# Patient Record
Sex: FEMALE | Race: WHITE | Employment: UNEMPLOYED | URBAN - METROPOLITAN AREA
[De-identification: names, ages, dates, MRNs, and addresses within clinical notes are randomized per-mention and may not be internally consistent; named-entity substitution may affect disease eponyms.]

---

## 2020-06-04 ENCOUNTER — APPOINTMENT (OUTPATIENT)
Dept: GENERAL RADIOLOGY | Facility: CLINIC | Age: 30
End: 2020-06-04
Payer: MEDICAID

## 2020-06-04 ENCOUNTER — HOSPITAL ENCOUNTER (EMERGENCY)
Facility: CLINIC | Age: 30
Discharge: HOME | End: 2020-06-04
Attending: EMERGENCY MEDICINE
Payer: MEDICAID

## 2020-06-04 VITALS
WEIGHT: 135 LBS | DIASTOLIC BLOOD PRESSURE: 87 MMHG | TEMPERATURE: 98.6 F | HEIGHT: 66 IN | RESPIRATION RATE: 20 BRPM | OXYGEN SATURATION: 100 % | BODY MASS INDEX: 21.69 KG/M2 | HEART RATE: 59 BPM | SYSTOLIC BLOOD PRESSURE: 137 MMHG

## 2020-06-04 PROCEDURE — 99283 EMERGENCY DEPT VISIT LOW MDM: CPT

## 2020-06-04 PROCEDURE — 73610 X-RAY EXAM OF ANKLE: CPT

## 2020-06-04 ASSESSMENT — PAIN DESCRIPTION - LOCATION: LOCATION: ANKLE

## 2020-06-04 ASSESSMENT — PAIN DESCRIPTION - ORIENTATION: ORIENTATION: RIGHT

## 2020-06-04 ASSESSMENT — PAIN SCALES - GENERAL: PAINLEVEL_OUTOF10: 7

## 2020-06-04 ASSESSMENT — PAIN DESCRIPTION - PAIN TYPE: TYPE: ACUTE PAIN

## 2020-09-13 ENCOUNTER — HOSPITAL ENCOUNTER (EMERGENCY)
Facility: CLINIC | Age: 30
Discharge: HOME | End: 2020-09-13
Attending: EMERGENCY MEDICINE
Payer: MEDICAID

## 2020-09-13 VITALS
OXYGEN SATURATION: 100 % | DIASTOLIC BLOOD PRESSURE: 90 MMHG | HEIGHT: 66 IN | HEART RATE: 62 BPM | TEMPERATURE: 98.6 F | WEIGHT: 132 LBS | RESPIRATION RATE: 16 BRPM | SYSTOLIC BLOOD PRESSURE: 123 MMHG | BODY MASS INDEX: 21.21 KG/M2

## 2020-09-13 LAB
ABSOLUTE EOS #: 0.1 K/UL (ref 0–0.4)
ABSOLUTE IMMATURE GRANULOCYTE: ABNORMAL K/UL (ref 0–0.3)
ABSOLUTE LYMPH #: 1.3 K/UL (ref 1–4.8)
ABSOLUTE MONO #: 0.8 K/UL (ref 0.1–1.2)
ANION GAP SERPL CALCULATED.3IONS-SCNC: 11 MMOL/L (ref 9–17)
BASOPHILS # BLD: 0 % (ref 0–2)
BASOPHILS ABSOLUTE: 0 K/UL (ref 0–0.2)
BILIRUBIN URINE: NEGATIVE
BUN BLDV-MCNC: 14 MG/DL (ref 6–20)
BUN/CREAT BLD: NORMAL (ref 9–20)
CALCIUM SERPL-MCNC: 9 MG/DL (ref 8.6–10.4)
CHLORIDE BLD-SCNC: 101 MMOL/L (ref 98–107)
CO2: 26 MMOL/L (ref 20–31)
COLOR: YELLOW
COMMENT UA: ABNORMAL
CREAT SERPL-MCNC: 0.7 MG/DL (ref 0.5–0.9)
DIFFERENTIAL TYPE: ABNORMAL
DIRECT EXAM: ABNORMAL
EOSINOPHILS RELATIVE PERCENT: 1 % (ref 1–4)
GFR AFRICAN AMERICAN: >60 ML/MIN
GFR NON-AFRICAN AMERICAN: >60 ML/MIN
GFR SERPL CREATININE-BSD FRML MDRD: NORMAL ML/MIN/{1.73_M2}
GFR SERPL CREATININE-BSD FRML MDRD: NORMAL ML/MIN/{1.73_M2}
GLUCOSE BLD-MCNC: 90 MG/DL (ref 70–99)
GLUCOSE URINE: NEGATIVE
HCG(URINE) PREGNANCY TEST: NEGATIVE
HCT VFR BLD CALC: 40 % (ref 36–46)
HEMOGLOBIN: 13.4 G/DL (ref 12–16)
IMMATURE GRANULOCYTES: ABNORMAL %
KETONES, URINE: ABNORMAL
LEUKOCYTE ESTERASE, URINE: NEGATIVE
LYMPHOCYTES # BLD: 16 % (ref 24–44)
Lab: ABNORMAL
MCH RBC QN AUTO: 30.8 PG (ref 26–34)
MCHC RBC AUTO-ENTMCNC: 33.5 G/DL (ref 31–37)
MCV RBC AUTO: 92 FL (ref 80–100)
MONOCYTES # BLD: 10 % (ref 2–11)
NITRITE, URINE: NEGATIVE
NRBC AUTOMATED: ABNORMAL PER 100 WBC
PDW BLD-RTO: 12.7 % (ref 12.5–15.4)
PH UA: 5.5 (ref 5–8)
PLATELET # BLD: 246 K/UL (ref 140–450)
PLATELET ESTIMATE: ABNORMAL
PMV BLD AUTO: 8.2 FL (ref 6–12)
POTASSIUM SERPL-SCNC: 4 MMOL/L (ref 3.7–5.3)
PROTEIN UA: NEGATIVE
RBC # BLD: 4.35 M/UL (ref 4–5.2)
RBC # BLD: ABNORMAL 10*6/UL
SEG NEUTROPHILS: 73 % (ref 36–66)
SEGMENTED NEUTROPHILS ABSOLUTE COUNT: 5.7 K/UL (ref 1.8–7.7)
SODIUM BLD-SCNC: 138 MMOL/L (ref 135–144)
SPECIFIC GRAVITY UA: 1 (ref 1–1.03)
SPECIMEN DESCRIPTION: ABNORMAL
TURBIDITY: CLEAR
URINE HGB: NEGATIVE
UROBILINOGEN, URINE: NORMAL
WBC # BLD: 7.9 K/UL (ref 3.5–11)
WBC # BLD: ABNORMAL 10*3/UL

## 2020-09-13 PROCEDURE — 36415 COLL VENOUS BLD VENIPUNCTURE: CPT

## 2020-09-13 PROCEDURE — 6360000002 HC RX W HCPCS: Performed by: EMERGENCY MEDICINE

## 2020-09-13 PROCEDURE — 96374 THER/PROPH/DIAG INJ IV PUSH: CPT

## 2020-09-13 PROCEDURE — 80048 BASIC METABOLIC PNL TOTAL CA: CPT

## 2020-09-13 PROCEDURE — 87491 CHLMYD TRACH DNA AMP PROBE: CPT

## 2020-09-13 PROCEDURE — 81003 URINALYSIS AUTO W/O SCOPE: CPT

## 2020-09-13 PROCEDURE — 87510 GARDNER VAG DNA DIR PROBE: CPT

## 2020-09-13 PROCEDURE — 81025 URINE PREGNANCY TEST: CPT

## 2020-09-13 PROCEDURE — 99284 EMERGENCY DEPT VISIT MOD MDM: CPT

## 2020-09-13 PROCEDURE — 87480 CANDIDA DNA DIR PROBE: CPT

## 2020-09-13 PROCEDURE — 87660 TRICHOMONAS VAGIN DIR PROBE: CPT

## 2020-09-13 PROCEDURE — 85025 COMPLETE CBC W/AUTO DIFF WBC: CPT

## 2020-09-13 PROCEDURE — 87591 N.GONORRHOEAE DNA AMP PROB: CPT

## 2020-09-13 RX ORDER — KETOROLAC TROMETHAMINE 15 MG/ML
15 INJECTION, SOLUTION INTRAMUSCULAR; INTRAVENOUS ONCE
Status: COMPLETED | OUTPATIENT
Start: 2020-09-13 | End: 2020-09-13

## 2020-09-13 RX ORDER — METRONIDAZOLE 500 MG/1
500 TABLET ORAL 2 TIMES DAILY
Qty: 14 TABLET | Refills: 0 | Status: SHIPPED | OUTPATIENT
Start: 2020-09-13 | End: 2020-09-20

## 2020-09-13 RX ORDER — FLUCONAZOLE 150 MG/1
150 TABLET ORAL ONCE
Qty: 1 TABLET | Refills: 0 | Status: SHIPPED | OUTPATIENT
Start: 2020-09-13 | End: 2020-09-13

## 2020-09-13 RX ADMIN — KETOROLAC TROMETHAMINE 15 MG: 15 INJECTION, SOLUTION INTRAMUSCULAR; INTRAVENOUS at 13:55

## 2020-09-13 ASSESSMENT — PAIN SCALES - GENERAL
PAINLEVEL_OUTOF10: 8
PAINLEVEL_OUTOF10: 8

## 2020-09-13 ASSESSMENT — ENCOUNTER SYMPTOMS: ABDOMINAL PAIN: 1

## 2020-09-13 NOTE — ED PROVIDER NOTES
for Gardnerella vaginalis. (A)     Direct Exam NEGATIVE for Candida sp. Direct Exam NEGATIVE for Trichomonas vaginalis     Direct Exam       Method of testing is a DNA probe intended for detection and identification of Candida species, Gardnerella vaginalis, and Trichomonas vaginalis nucleic acid in vaginal fluid specimens from patients with symptoms of vaginitis/vaginosis.    CBC Auto Differential   Result Value Ref Range    WBC 7.9 3.5 - 11.0 k/uL    RBC 4.35 4.0 - 5.2 m/uL    Hemoglobin 13.4 12.0 - 16.0 g/dL    Hematocrit 40.0 36 - 46 %    MCV 92.0 80 - 100 fL    MCH 30.8 26 - 34 pg    MCHC 33.5 31 - 37 g/dL    RDW 12.7 12.5 - 15.4 %    Platelets 997 502 - 216 k/uL    MPV 8.2 6.0 - 12.0 fL    NRBC Automated NOT REPORTED per 100 WBC    Differential Type NOT REPORTED     Seg Neutrophils 73 (H) 36 - 66 %    Lymphocytes 16 (L) 24 - 44 %    Monocytes 10 2 - 11 %    Eosinophils % 1 1 - 4 %    Basophils 0 0 - 2 %    Immature Granulocytes NOT REPORTED 0 %    Segs Absolute 5.70 1.8 - 7.7 k/uL    Absolute Lymph # 1.30 1.0 - 4.8 k/uL    Absolute Mono # 0.80 0.1 - 1.2 k/uL    Absolute Eos # 0.10 0.0 - 0.4 k/uL    Basophils Absolute 0.00 0.0 - 0.2 k/uL    Absolute Immature Granulocyte NOT REPORTED 0.00 - 0.30 k/uL    WBC Morphology NOT REPORTED     RBC Morphology NOT REPORTED     Platelet Estimate NOT REPORTED    Basic Metabolic Panel   Result Value Ref Range    Glucose 90 70 - 99 mg/dL    BUN 14 6 - 20 mg/dL    CREATININE 0.70 0.50 - 0.90 mg/dL    Bun/Cre Ratio NOT REPORTED 9 - 20    Calcium 9.0 8.6 - 10.4 mg/dL    Sodium 138 135 - 144 mmol/L    Potassium 4.0 3.7 - 5.3 mmol/L    Chloride 101 98 - 107 mmol/L    CO2 26 20 - 31 mmol/L    Anion Gap 11 9 - 17 mmol/L    GFR Non-African American >60 >60 mL/min    GFR African American >60 >60 mL/min    GFR Comment          GFR Staging NOT REPORTED    Pregnancy, Urine   Result Value Ref Range    HCG(Urine) Pregnancy Test NEGATIVE NEGATIVE   Urinalysis Reflex to Culture   Result Value Ref Range    Color, UA YELLOW YELLOW    Turbidity UA CLEAR CLEAR    Glucose, Ur NEGATIVE NEGATIVE    Bilirubin Urine NEGATIVE NEGATIVE    Ketones, Urine SMALL (A) NEGATIVE    Specific Gravity, UA 1.004 (L) 1.005 - 1.030    Urine Hgb NEGATIVE NEGATIVE    pH, UA 5.5 5.0 - 8.0    Protein, UA NEGATIVE NEGATIVE    Urobilinogen, Urine Normal Normal    Nitrite, Urine NEGATIVE NEGATIVE    Leukocyte Esterase, Urine NEGATIVE NEGATIVE    Urinalysis Comments       Microscopic exam not performed based on chemical results unless requested in original order. Urinalysis Comments          Urinalysis Comments       Utilizing a urinalysis as the only screening method to exclude a potential uropathogen can be unreliable in many patient populations. Rapid screening tests are less sensitive than culture and if UTI is a clinical possibility, culture should be considered despite a negative urinalysis. EMERGENCY DEPARTMENT COURSE:   Vitals:    Vitals:    09/13/20 1328 09/13/20 1331   BP:  (!) 123/90   Pulse:  62   Resp:  16   Temp:  98.6 °F (37 °C)   TempSrc:  Oral   SpO2:  100%   Weight: 59.9 kg (132 lb)    Height: 5' 6\" (1.676 m)      -------------------------  BP: (!) 123/90, Temp: 98.6 °F (37 °C), Pulse: 62, Resp: 16      RE-EVALUATION:  Lab work and UA are unremarkable pelvic labs do reveal that the patient has a bacterial vaginosis she has no peritoneal findings given this I do feel able to be followed up as an outpatient I will write her a prescription for Flagyl recommending that she return to the ER for increased cramping any development of fever vomiting or other concerns otherwise follow-up with the family doctor of her choice calling [phone] for an appointment  At this time the patient is without objective evidence of an acute process requiring hospitalization or inpatient management.  They have remained hemodynamically stable throughout their entire ED visit and are stable for discharge with outpatient

## 2020-09-15 LAB
C TRACH DNA GENITAL QL NAA+PROBE: ABNORMAL
N. GONORRHOEAE DNA: NEGATIVE
SPECIMEN DESCRIPTION: ABNORMAL

## 2020-12-18 ENCOUNTER — HOSPITAL ENCOUNTER (OUTPATIENT)
Dept: LAB | Age: 30
Setting detail: SPECIMEN
Discharge: HOME | End: 2020-12-18
Payer: COMMERCIAL

## 2020-12-18 PROCEDURE — U0003 INFECTIOUS AGENT DETECTION BY NUCLEIC ACID (DNA OR RNA); SEVERE ACUTE RESPIRATORY SYNDROME CORONAVIRUS 2 (SARS-COV-2) (CORONAVIRUS DISEASE [COVID-19]), AMPLIFIED PROBE TECHNIQUE, MAKING USE OF HIGH THROUGHPUT TECHNOLOGIES AS DESCRIBED BY CMS-2020-01-R: HCPCS

## 2020-12-20 LAB
SARS-COV-2, RAPID: NORMAL
SARS-COV-2: NORMAL
SARS-COV-2: NOT DETECTED
SOURCE: NORMAL

## 2020-12-21 ENCOUNTER — ANESTHESIA EVENT (OUTPATIENT)
Dept: OPERATING ROOM | Age: 30
End: 2020-12-21
Payer: COMMERCIAL

## 2020-12-22 ENCOUNTER — HOSPITAL ENCOUNTER (OUTPATIENT)
Age: 30
Setting detail: OUTPATIENT SURGERY
Discharge: HOME | End: 2020-12-22
Attending: SURGERY | Admitting: SURGERY
Payer: COMMERCIAL

## 2020-12-22 ENCOUNTER — ANESTHESIA (OUTPATIENT)
Dept: OPERATING ROOM | Age: 30
End: 2020-12-22
Payer: COMMERCIAL

## 2020-12-22 VITALS — SYSTOLIC BLOOD PRESSURE: 107 MMHG | OXYGEN SATURATION: 100 % | DIASTOLIC BLOOD PRESSURE: 73 MMHG | TEMPERATURE: 97.5 F

## 2020-12-22 VITALS
RESPIRATION RATE: 16 BRPM | SYSTOLIC BLOOD PRESSURE: 109 MMHG | TEMPERATURE: 96.9 F | HEIGHT: 66 IN | OXYGEN SATURATION: 100 % | DIASTOLIC BLOOD PRESSURE: 62 MMHG | HEART RATE: 50 BPM | WEIGHT: 131 LBS | BODY MASS INDEX: 21.05 KG/M2

## 2020-12-22 LAB
-: NORMAL
HCG, PREGNANCY URINE (POC): NEGATIVE

## 2020-12-22 PROCEDURE — 2500000003 HC RX 250 WO HCPCS: Performed by: SURGERY

## 2020-12-22 PROCEDURE — 7100000010 HC PHASE II RECOVERY - FIRST 15 MIN: Performed by: SURGERY

## 2020-12-22 PROCEDURE — 7100000000 HC PACU RECOVERY - FIRST 15 MIN: Performed by: SURGERY

## 2020-12-22 PROCEDURE — 2500000003 HC RX 250 WO HCPCS: Performed by: NURSE ANESTHETIST, CERTIFIED REGISTERED

## 2020-12-22 PROCEDURE — 7100000031 HC ASPR PHASE II RECOVERY - ADDTL 15 MIN: Performed by: SURGERY

## 2020-12-22 PROCEDURE — 3700000000 HC ANESTHESIA ATTENDED CARE: Performed by: SURGERY

## 2020-12-22 PROCEDURE — 6360000002 HC RX W HCPCS: Performed by: NURSE ANESTHETIST, CERTIFIED REGISTERED

## 2020-12-22 PROCEDURE — 3700000001 HC ADD 15 MINUTES (ANESTHESIA): Performed by: SURGERY

## 2020-12-22 PROCEDURE — 3600000002 HC SURGERY LEVEL 2 BASE: Performed by: SURGERY

## 2020-12-22 PROCEDURE — 7100000030 HC ASPR PHASE II RECOVERY - FIRST 15 MIN: Performed by: SURGERY

## 2020-12-22 PROCEDURE — 99999 PR OFFICE/OUTPT VISIT,PROCEDURE ONLY: CPT | Performed by: PHYSICIAN ASSISTANT

## 2020-12-22 PROCEDURE — 2580000003 HC RX 258: Performed by: ANESTHESIOLOGY

## 2020-12-22 PROCEDURE — 2709999900 HC NON-CHARGEABLE SUPPLY: Performed by: SURGERY

## 2020-12-22 PROCEDURE — 88304 TISSUE EXAM BY PATHOLOGIST: CPT

## 2020-12-22 PROCEDURE — 7100000001 HC PACU RECOVERY - ADDTL 15 MIN: Performed by: SURGERY

## 2020-12-22 PROCEDURE — 7100000011 HC PHASE II RECOVERY - ADDTL 15 MIN: Performed by: SURGERY

## 2020-12-22 PROCEDURE — 6360000002 HC RX W HCPCS: Performed by: SURGERY

## 2020-12-22 PROCEDURE — 81025 URINE PREGNANCY TEST: CPT

## 2020-12-22 PROCEDURE — 3600000012 HC SURGERY LEVEL 2 ADDTL 15MIN: Performed by: SURGERY

## 2020-12-22 RX ORDER — MEPERIDINE HYDROCHLORIDE 25 MG/ML
12.5 INJECTION INTRAMUSCULAR; INTRAVENOUS; SUBCUTANEOUS EVERY 5 MIN PRN
Status: DISCONTINUED | OUTPATIENT
Start: 2020-12-22 | End: 2020-12-22 | Stop reason: HOSPADM

## 2020-12-22 RX ORDER — ONDANSETRON 2 MG/ML
INJECTION INTRAMUSCULAR; INTRAVENOUS PRN
Status: DISCONTINUED | OUTPATIENT
Start: 2020-12-22 | End: 2020-12-22 | Stop reason: SDUPTHER

## 2020-12-22 RX ORDER — PROPOFOL 10 MG/ML
INJECTION, EMULSION INTRAVENOUS PRN
Status: DISCONTINUED | OUTPATIENT
Start: 2020-12-22 | End: 2020-12-22 | Stop reason: SDUPTHER

## 2020-12-22 RX ORDER — MIDAZOLAM HYDROCHLORIDE 1 MG/ML
INJECTION INTRAMUSCULAR; INTRAVENOUS PRN
Status: DISCONTINUED | OUTPATIENT
Start: 2020-12-22 | End: 2020-12-22 | Stop reason: SDUPTHER

## 2020-12-22 RX ORDER — KETOROLAC TROMETHAMINE 30 MG/ML
INJECTION, SOLUTION INTRAMUSCULAR; INTRAVENOUS PRN
Status: DISCONTINUED | OUTPATIENT
Start: 2020-12-22 | End: 2020-12-22 | Stop reason: SDUPTHER

## 2020-12-22 RX ORDER — NICOTINE POLACRILEX 2 MG
1 GUM BUCCAL DAILY
COMMUNITY

## 2020-12-22 RX ORDER — LABETALOL HYDROCHLORIDE 5 MG/ML
5 INJECTION, SOLUTION INTRAVENOUS EVERY 10 MIN PRN
Status: DISCONTINUED | OUTPATIENT
Start: 2020-12-22 | End: 2020-12-22 | Stop reason: HOSPADM

## 2020-12-22 RX ORDER — MORPHINE SULFATE 2 MG/ML
2 INJECTION, SOLUTION INTRAMUSCULAR; INTRAVENOUS EVERY 5 MIN PRN
Status: DISCONTINUED | OUTPATIENT
Start: 2020-12-22 | End: 2020-12-22 | Stop reason: HOSPADM

## 2020-12-22 RX ORDER — BUPIVACAINE HYDROCHLORIDE 5 MG/ML
INJECTION, SOLUTION EPIDURAL; INTRACAUDAL PRN
Status: DISCONTINUED | OUTPATIENT
Start: 2020-12-22 | End: 2020-12-22 | Stop reason: ALTCHOICE

## 2020-12-22 RX ORDER — LIDOCAINE HYDROCHLORIDE 10 MG/ML
1 INJECTION, SOLUTION EPIDURAL; INFILTRATION; INTRACAUDAL; PERINEURAL
Status: DISCONTINUED | OUTPATIENT
Start: 2020-12-22 | End: 2020-12-22 | Stop reason: HOSPADM

## 2020-12-22 RX ORDER — DIPHENHYDRAMINE HYDROCHLORIDE 50 MG/ML
12.5 INJECTION INTRAMUSCULAR; INTRAVENOUS
Status: DISCONTINUED | OUTPATIENT
Start: 2020-12-22 | End: 2020-12-22 | Stop reason: HOSPADM

## 2020-12-22 RX ORDER — ONDANSETRON 4 MG/1
TABLET, FILM COATED ORAL
Qty: 20 TABLET | Refills: 0 | Status: SHIPPED | OUTPATIENT
Start: 2020-12-22

## 2020-12-22 RX ORDER — SODIUM CHLORIDE 0.9 % (FLUSH) 0.9 %
10 SYRINGE (ML) INJECTION EVERY 12 HOURS SCHEDULED
Status: DISCONTINUED | OUTPATIENT
Start: 2020-12-22 | End: 2020-12-22 | Stop reason: HOSPADM

## 2020-12-22 RX ORDER — SODIUM CHLORIDE 0.9 % (FLUSH) 0.9 %
10 SYRINGE (ML) INJECTION PRN
Status: DISCONTINUED | OUTPATIENT
Start: 2020-12-22 | End: 2020-12-22 | Stop reason: HOSPADM

## 2020-12-22 RX ORDER — CEPHALEXIN 500 MG/1
CAPSULE ORAL
Qty: 21 CAPSULE | Refills: 0 | Status: SHIPPED | OUTPATIENT
Start: 2020-12-22

## 2020-12-22 RX ORDER — DEXAMETHASONE SODIUM PHOSPHATE 4 MG/ML
INJECTION, SOLUTION INTRA-ARTICULAR; INTRALESIONAL; INTRAMUSCULAR; INTRAVENOUS; SOFT TISSUE PRN
Status: DISCONTINUED | OUTPATIENT
Start: 2020-12-22 | End: 2020-12-22 | Stop reason: SDUPTHER

## 2020-12-22 RX ORDER — OXYCODONE HYDROCHLORIDE AND ACETAMINOPHEN 5; 325 MG/1; MG/1
1 TABLET ORAL EVERY 6 HOURS PRN
Qty: 28 TABLET | Refills: 0 | Status: SHIPPED | OUTPATIENT
Start: 2020-12-22 | End: 2020-12-29

## 2020-12-22 RX ORDER — SODIUM CHLORIDE, SODIUM LACTATE, POTASSIUM CHLORIDE, CALCIUM CHLORIDE 600; 310; 30; 20 MG/100ML; MG/100ML; MG/100ML; MG/100ML
INJECTION, SOLUTION INTRAVENOUS CONTINUOUS
Status: DISCONTINUED | OUTPATIENT
Start: 2020-12-22 | End: 2020-12-22 | Stop reason: HOSPADM

## 2020-12-22 RX ORDER — ONDANSETRON 2 MG/ML
4 INJECTION INTRAMUSCULAR; INTRAVENOUS
Status: DISCONTINUED | OUTPATIENT
Start: 2020-12-22 | End: 2020-12-22 | Stop reason: HOSPADM

## 2020-12-22 RX ORDER — LIDOCAINE HYDROCHLORIDE 10 MG/ML
INJECTION, SOLUTION EPIDURAL; INFILTRATION; INTRACAUDAL; PERINEURAL PRN
Status: DISCONTINUED | OUTPATIENT
Start: 2020-12-22 | End: 2020-12-22 | Stop reason: SDUPTHER

## 2020-12-22 RX ORDER — FENTANYL CITRATE 50 UG/ML
INJECTION, SOLUTION INTRAMUSCULAR; INTRAVENOUS PRN
Status: DISCONTINUED | OUTPATIENT
Start: 2020-12-22 | End: 2020-12-22 | Stop reason: SDUPTHER

## 2020-12-22 RX ADMIN — ONDANSETRON 4 MG: 2 INJECTION INTRAMUSCULAR; INTRAVENOUS at 10:45

## 2020-12-22 RX ADMIN — PROPOFOL 160 MG: 10 INJECTION, EMULSION INTRAVENOUS at 10:35

## 2020-12-22 RX ADMIN — MIDAZOLAM 2 MG: 1 INJECTION INTRAMUSCULAR; INTRAVENOUS at 10:28

## 2020-12-22 RX ADMIN — DEXAMETHASONE SODIUM PHOSPHATE 4 MG: 4 INJECTION, SOLUTION INTRAMUSCULAR; INTRAVENOUS at 10:43

## 2020-12-22 RX ADMIN — KETOROLAC TROMETHAMINE 30 MG: 30 INJECTION, SOLUTION INTRAMUSCULAR; INTRAVENOUS at 11:12

## 2020-12-22 RX ADMIN — LIDOCAINE HYDROCHLORIDE 50 MG: 10 INJECTION, SOLUTION EPIDURAL; INFILTRATION; INTRACAUDAL; PERINEURAL at 10:35

## 2020-12-22 RX ADMIN — CEFAZOLIN 2 G: 10 INJECTION, POWDER, FOR SOLUTION INTRAVENOUS at 10:35

## 2020-12-22 RX ADMIN — SODIUM CHLORIDE, POTASSIUM CHLORIDE, SODIUM LACTATE AND CALCIUM CHLORIDE: 600; 310; 30; 20 INJECTION, SOLUTION INTRAVENOUS at 10:16

## 2020-12-22 RX ADMIN — FENTANYL CITRATE 50 MCG: 50 INJECTION, SOLUTION INTRAMUSCULAR; INTRAVENOUS at 11:18

## 2020-12-22 ASSESSMENT — PULMONARY FUNCTION TESTS
PIF_VALUE: 13
PIF_VALUE: 14
PIF_VALUE: 3
PIF_VALUE: 15
PIF_VALUE: 17
PIF_VALUE: 15
PIF_VALUE: 15
PIF_VALUE: 14
PIF_VALUE: 15
PIF_VALUE: 15
PIF_VALUE: 13
PIF_VALUE: 15
PIF_VALUE: 11
PIF_VALUE: 18
PIF_VALUE: 15
PIF_VALUE: 1
PIF_VALUE: 2
PIF_VALUE: 15
PIF_VALUE: 20
PIF_VALUE: 15
PIF_VALUE: 2
PIF_VALUE: 0
PIF_VALUE: 15
PIF_VALUE: 1
PIF_VALUE: 15
PIF_VALUE: 15
PIF_VALUE: 14
PIF_VALUE: 15
PIF_VALUE: 13
PIF_VALUE: 17
PIF_VALUE: 15
PIF_VALUE: 2
PIF_VALUE: 21
PIF_VALUE: 15
PIF_VALUE: 15
PIF_VALUE: 13
PIF_VALUE: 17
PIF_VALUE: 3
PIF_VALUE: 15
PIF_VALUE: 14
PIF_VALUE: 15
PIF_VALUE: 15
PIF_VALUE: 0

## 2020-12-22 ASSESSMENT — PAIN SCALES - GENERAL
PAINLEVEL_OUTOF10: 0

## 2020-12-22 ASSESSMENT — LIFESTYLE VARIABLES: SMOKING_STATUS: 0

## 2020-12-22 ASSESSMENT — ENCOUNTER SYMPTOMS
STRIDOR: 0
SHORTNESS OF BREATH: 0

## 2020-12-22 ASSESSMENT — PAIN - FUNCTIONAL ASSESSMENT: PAIN_FUNCTIONAL_ASSESSMENT: 0-10

## 2020-12-22 NOTE — H&P
HISTORY and Erasm[id number]       NAME:  Garrett Duarte  MRN: [id number]   YOB: 1990   Date: 12/22/2020   Age: 26 y.o. Gender: female       COMPLAINT AND PRESENT HISTORY:     Garrett Duarte is 26 y.o.,  female, here for left shoulder lipoma with scheduled left shoulder lesion excision biopsy. Pt initially noticed lipoma about eight years ago but it has been increasing in size. Pt reports pain to left lateral shoulder. Describes pain as a throbbing pain. Worse with activity such as lifting. Pt is a . Rating pain 8/10 on average. Does not take any pain medications for relief. Reports lipoma becomes red at times. Denies any drainage. Denies fever or chills. NPO. No medications taken this am. Denies taking any blood thinners. Denies chest pain/pressure, palpitations, SOB, recent URI, fever or chills.      PAST MEDICAL HISTORY     Past Medical History:   Diagnosis Date    Asthma     Hx of blood clots 2019    rt shoulder       SURGICAL HISTORY       Past Surgical History:   Procedure Laterality Date    COLPOSCOPY         FAMILY HISTORY       Family History   Problem Relation Age of Onset    No Known Problems Mother     No Known Problems Father        SOCIAL HISTORY       Social History     Socioeconomic History    Marital status: Single     Spouse name: Not on file    Number of children: Not on file    Years of education: Not on file    Highest education level: Not on file   Occupational History    Not on file   Social Needs    Financial resource strain: Not on file    Food insecurity     Worry: Not on file     Inability: Not on file    Transportation needs     Medical: Not on file     Non-medical: Not on file   Tobacco Use    Smoking status: Never Smoker    Smokeless tobacco: Never Used   Substance and Sexual Activity    Alcohol use: Yes     Frequency: 2-3 times a week     Comment: 3x/week    Drug use: Never    Sexual activity: Yes   Lifestyle  Physical activity     Days per week: Not on file     Minutes per session: Not on file    Stress: Not on file   Relationships    Social connections     Talks on phone: Not on file     Gets together: Not on file     Attends Druze service: Not on file     Active member of club or organization: Not on file     Attends meetings of clubs or organizations: Not on file     Relationship status: Not on file    Intimate partner violence     Fear of current or ex partner: Not on file     Emotionally abused: Not on file     Physically abused: Not on file     Forced sexual activity: Not on file   Other Topics Concern    Not on file   Social History Narrative    Not on file        REVIEW OF SYSTEMS      No Known Allergies    No current facility-administered medications on file prior to encounter. Current Outpatient Medications on File Prior to Encounter   Medication Sig Dispense Refill    albuterol (PROVENTIL) (2.5 MG/3ML) 0.083% nebulizer solution Take 2.5 mg by nebulization every 6 hours as needed for Wheezing      Multiple Vitamins-Minerals (THERAPEUTIC MULTIVITAMIN-MINERALS) tablet Take 1 tablet by mouth daily     Notation: Above medications are not currently reconciled at time of signing this H&P note, to be reconciled in pre-op per RN. Negative except for what is mentioned in the HPI. GENERAL PHYSICAL EXAM     Vitals: Review vitals per RN flowsheet. GENERAL APPEARANCE:   Jennifer Kasper is 26 y.o.,  female, nourished, conscious, alert. Does not appear to be in distress or pain at this time. SKIN:  Left lateral upper arm, smooth, mobile cutaneous mass palpated, non tender, no erythema, no drainage, no open wounds. Warm, dry, no cyanosis or jaundice. HEAD:  Normocephalic, atraumatic. EYES:  Pupils equal, reactive to light. EARS:  No discharge, no marked hearing loss.                NOSE:  No rhinorrhea, epistaxis or septal deformity. THROAT:  Not congested. No ulceration bleeding or discharge. NECK:  No stiffness, trachea central.  No palpable masses or L.N.                 CHEST:  Symmetrical and equal on expansion. HEART:  RRR S1 > S2. No audible murmurs or gallops. LUNGS:  Equal on expansion, normal breath sounds. No wheezing, rhonchi or rales. ABDOMEN:  Soft on palpation. No localized tenderness. No guarding or rigidity. LYMPHATICS:  No palpable cervical lymphadenopathy. LOCOMOTOR, BACK AND SPINE:  No tenderness or deformities. EXTREMITIES:  Symmetrical, no pretibial edema. No calf tenderness. No discoloration or ulcerations. NEUROLOGIC:  The patient is conscious, alert, oriented. No facial drooping. Speech clear. No apparent focal sensory or motor deficits. PROVISIONAL DIAGNOSES / SURGERY:      LEFT SHOULDER LIPOMA     SHOULDER LESION BIOPSY EXCISION Left    There are no active problems to display for this patient.           MEAGAN Metzger - CNP on 12/22/2020 at 9:37 AM

## 2020-12-22 NOTE — ANESTHESIA POSTPROCEDURE EVALUATION
Department of Anesthesiology  Postprocedure Note    Patient: Dharmesh Saini  MRN: [id number]  YOB: 1990  Date of evaluation: 12/22/2020  Time:  12:04 PM     Procedure Summary     Date: 12/22/20 Room / Location: [facility] / [facility]     Anesthesia Start: 1030 Anesthesia Stop: 4660    Procedure: SHOULDER LESION BIOPSY EXCISION (Left Shoulder) Diagnosis: (LEFT SHOULDER LIPOMA (PAT ON ADMIT - OFFICE TO [place] [facility]))    Surgeons: Autumn Villalpando MD Responsible Provider: Rufino Baumgarten, MD    Anesthesia Type: general ASA Status: 2          Anesthesia Type: general    Masoud Phase I: Masoud Score: 9    Masoud Phase II:      Last vitals: Reviewed and per EMR flowsheets.        Anesthesia Post Evaluation    Comments: POST- ANESTHESIA EVALUATION       Pt Name: Dharmesh Saini  MRN: [id number]  YOB: 1990  Date of evaluation: 12/22/2020  Time:  12:04 PM      /67   Pulse (!) 44   Temp 97.4 °F (36.3 °C) (Infrared)   Resp 12   Ht 5' 6\" (1.676 m)   Wt 131 lb (59.4 kg)   SpO2 100%   BMI 21.14 kg/m²      Consciousness Level  Awake  Cardiopulmonary Status  Stable  Pain Adequately Treated YES  Nausea / Vomiting  NO  Adequate Hydration  YES  Anesthesia Related Complications NONE      Electronically signed by Rufino Baumgarten, MD on 12/22/2020 at 12:04 PM

## 2020-12-22 NOTE — ANESTHESIA PRE PROCEDURE
Department of Anesthesiology  Preprocedure Note       Name:  Kenya Mederos   Age:  26 y.o.  :  1990                                          MRN:  [id number]         Date:  2020      Surgeon: Aura Hernandez):  Glendy Marks MD    Procedure: Procedure(s):  SHOULDER LESION BIOPSY EXCISION    Medications prior to admission:   Prior to Admission medications    Medication Sig Start Date End Date Taking? Authorizing Provider   Biotin 1 MG CAPS Take 1 capsule by mouth daily   Yes Historical Provider, MD   Multiple Vitamins-Minerals (THERAPEUTIC MULTIVITAMIN-MINERALS) tablet Take 1 tablet by mouth daily   Yes Historical Provider, MD   albuterol (PROVENTIL) (2.5 MG/3ML) 0.083% nebulizer solution Take 2.5 mg by nebulization every 6 hours as needed for Wheezing    Historical Provider, MD       Current medications:    Current Facility-Administered Medications   Medication Dose Route Frequency Provider Last Rate Last Admin    sodium chloride flush 0.9 % injection 10 mL  10 mL Intravenous 2 times per day Candida Tejeda MD        sodium chloride flush 0.9 % injection 10 mL  10 mL Intravenous PRN Candida Tejeda MD        lidocaine PF 1 % injection 1 mL  1 mL Intradermal Once PRN Candida Tejeda MD        lactated ringers infusion   Intravenous Continuous Candida Tejeda  mL/hr at 20 1016 New Bag at 20 1016    ceFAZolin (ANCEF) 2 g in dextrose 5 % 50 mL IVPB  2 g Intravenous Once Glendy Marks MD           Allergies:  No Known Allergies    Problem List:  There is no problem list on file for this patient.       Past Medical History:        Diagnosis Date    Asthma     Hx of blood clots 2019    rt shoulder       Past Surgical History:        Procedure Laterality Date    COLPOSCOPY         Social History:    Social History     Tobacco Use    Smoking status: Never Smoker    Smokeless tobacco: Never Used   Substance Use Topics    Alcohol use: Yes     Frequency: 2-3 times a week     Comment: 3x/week Counseling given: Not Answered      Vital Signs (Current):   Vitals:    12/22/20 1000   BP: 112/69   Pulse: 50   Resp: 16   Temp: 97.8 °F (36.6 °C)   TempSrc: Infrared   SpO2: 100%   Weight: 131 lb (59.4 kg)   Height: 5' 6\" (1.676 m)                                              BP Readings from Last 3 Encounters:   12/22/20 112/69   09/13/20 (!) 123/90   06/04/20 137/87       NPO Status: Time of last liquid consumption: 2200                        Time of last solid consumption: 2200                        Date of last liquid consumption: 12/21/20                        Date of last solid food consumption: 12/21/20    BMI:   Wt Readings from Last 3 Encounters:   12/22/20 131 lb (59.4 kg)   09/13/20 132 lb (59.9 kg)   06/04/20 135 lb (61.2 kg)     Body mass index is 21.14 kg/m². CBC:   Lab Results   Component Value Date    WBC 7.9 09/13/2020    RBC 4.35 09/13/2020    HGB 13.4 09/13/2020    HCT 40.0 09/13/2020    MCV 92.0 09/13/2020    RDW 12.7 09/13/2020     09/13/2020     LR    CMP:   Lab Results   Component Value Date     09/13/2020    K 4.0 09/13/2020     09/13/2020    CO2 26 09/13/2020    BUN 14 09/13/2020    CREATININE 0.70 09/13/2020    GFRAA >60 09/13/2020    LABGLOM >60 09/13/2020    GLUCOSE 90 09/13/2020    CALCIUM 9.0 09/13/2020       POC Tests: No results for input(s): POCGLU, POCNA, POCK, POCCL, POCBUN, POCHEMO, POCHCT in the last 72 hours.     Coags: No results found for: PROTIME, INR, APTT    HCG (If Applicable):   Lab Results   Component Value Date    PREGTESTUR NEGATIVE 09/13/2020    HCG NEGATIVE 12/22/2020        ABGs: No results found for: PHART, PO2ART, PDF8AKK, AAC7IOI, BEART, Z7IYKLCT     Type & Screen (If Applicable):  No results found for: LABABO, LABRH    Drug/Infectious Status (If Applicable):  No results found for: HIV, HEPCAB    COVID-19 Screening (If Applicable):   Lab Results   Component Value Date    COVID19 Not Detected 12/18/2020 Anesthesia Evaluation  Patient summary reviewed and Nursing notes reviewed no history of anesthetic complications:   Airway: Mallampati: II  TM distance: >3 FB   Neck ROM: full  Mouth opening: > = 3 FB Dental: normal exam         Pulmonary:normal exam  breath sounds clear to auscultation  (+) asthma: allergic asthma,     (-) pneumonia, COPD, shortness of breath, recent URI, sleep apnea, rhonchi, wheezes, rales, stridor and not a current smoker          Patient did not smoke on day of surgery. Cardiovascular:Negative CV ROS  Exercise tolerance: good (>4 METS),       (-) pacemaker, hypertension, valvular problems/murmurs, past MI, CAD, CABG/stent, dysrhythmias,  angina,  CHF, orthopnea, PND,  BUSCH, murmur, weak pulses,  friction rub, systolic click, carotid bruit,  JVD and peripheral edema      Rhythm: regular  Rate: normal           Beta Blocker:  Not on Beta Blocker         Neuro/Psych:   Negative Neuro/Psych ROS     (-) seizures, neuromuscular disease, TIA, CVA, headaches, psychiatric history and depression/anxiety            GI/Hepatic/Renal: Neg GI/Hepatic/Renal ROS       (-) hiatal hernia, GERD, PUD, hepatitis, liver disease, no renal disease, bowel prep and no morbid obesity       Endo/Other: Negative Endo/Other ROS   (+) no malignancy/cancer. (-) diabetes mellitus, hypothyroidism, hyperthyroidism, blood dyscrasia, arthritis, no electrolyte abnormalities, no malignancy/cancer               Abdominal:           Vascular: negative vascular ROS. - PVD, DVT and PE. Anesthesia Plan      general     ASA 2       Induction: intravenous. MIPS: Postoperative opioids intended and Prophylactic antiemetics administered. Anesthetic plan and risks discussed with patient. Plan discussed with CRNA. The patient was counseled at length about the risks of gracie Covid-19 during their perioperative period and any recovery window from their procedure. The patient was made aware that gracie Covid-19  may worsen their prognosis for recovering from their procedure  and lend to a higher morbidity and/or mortality risk. All material risks, benefits, and reasonable alternatives including postponing the procedure were discussed. The patient DOES wish to proceed with the procedure at this time.             Severo Gavel, MD   12/22/2020

## 2020-12-22 NOTE — OP NOTE
Operative Note      Patient: Jennifer Kasper  YOB: 1990  MRN: [id number]    Date of Procedure: 12/22/2020                Preoperative diagnosis: Left shoulder lipoma    Postoperative diagnosis: Same    Procedure: Excision left shoulder lipoma 6 x 5.5 x 1.5 cm    Surgeon: Dr. Ad Portillo    Asst.: JOSY Verde    Anesthesia: General    Preparation: Chloraprep    EBL: Less than 10 mL    Specimen: Lipoma    Procedure: Informed consent was obtained. Site was marked and confirmed. Preoperative antibiotics were given. Patient was taken to the operating room. General anesthesia was given. Operative site was prepped and draped in usual sterile fashion. Timeout was done. Incision was made over the lipoma using a #10 blade. Incision was deepened with help of Bovie cautery. Approximately 6 x 5.5 x 1.5 cm lipoma was enucleated. Specimen was submitted to pathology. Wound was explored. Hemostasis was confirmed. Sponge needle instrument count was found to be correct. Wound was approximated using absorbable sutures. Local anesthetic was infiltrated. Dermabond was applied. Steri-Strips were applied. Sterile dressing was applied. Patient tolerated procedure well and was transferred to the recovery room in a stable condition.     -  Recommendations: Operative findings are discussed with the family. Postoperative care recovery restrictions follow-up were all discussed. Prescriptions called in. Discharge instructions in the chart.

## 2020-12-24 LAB — SURGICAL PATHOLOGY REPORT: NORMAL

## 2023-01-23 ENCOUNTER — OFFICE VISIT (OUTPATIENT)
Dept: OBGYN CLINIC | Age: 33
End: 2023-01-23
Payer: COMMERCIAL

## 2023-01-23 ENCOUNTER — HOSPITAL ENCOUNTER (OUTPATIENT)
Age: 33
Setting detail: SPECIMEN
Discharge: HOME | End: 2023-01-23
Payer: COMMERCIAL

## 2023-01-23 VITALS
SYSTOLIC BLOOD PRESSURE: 114 MMHG | DIASTOLIC BLOOD PRESSURE: 72 MMHG | WEIGHT: 137 LBS | BODY MASS INDEX: 22.02 KG/M2 | HEIGHT: 66 IN

## 2023-01-23 DIAGNOSIS — R11.2 NAUSEA AND VOMITING, UNSPECIFIED VOMITING TYPE: ICD-10-CM

## 2023-01-23 DIAGNOSIS — N92.6 MISSED MENSES: Primary | ICD-10-CM

## 2023-01-23 DIAGNOSIS — N92.6 MISSED MENSES: ICD-10-CM

## 2023-01-23 PROBLEM — I82.A11 ACUTE DEEP VEIN THROMBOSIS (DVT) OF AXILLARY VEIN OF RIGHT UPPER EXTREMITY (HCC): Status: ACTIVE | Noted: 2019-04-28

## 2023-01-23 LAB — HCG QUANTITATIVE: ABNORMAL MIU/ML

## 2023-01-23 PROCEDURE — G8484 FLU IMMUNIZE NO ADMIN: HCPCS | Performed by: NURSE PRACTITIONER

## 2023-01-23 PROCEDURE — 84702 CHORIONIC GONADOTROPIN TEST: CPT

## 2023-01-23 PROCEDURE — G8420 CALC BMI NORM PARAMETERS: HCPCS | Performed by: NURSE PRACTITIONER

## 2023-01-23 PROCEDURE — 36415 COLL VENOUS BLD VENIPUNCTURE: CPT

## 2023-01-23 PROCEDURE — 99203 OFFICE O/P NEW LOW 30 MIN: CPT | Performed by: NURSE PRACTITIONER

## 2023-01-23 PROCEDURE — G8427 DOCREV CUR MEDS BY ELIG CLIN: HCPCS | Performed by: NURSE PRACTITIONER

## 2023-01-23 PROCEDURE — 1036F TOBACCO NON-USER: CPT | Performed by: NURSE PRACTITIONER

## 2023-01-23 RX ORDER — PNV NO.95/FERROUS FUM/FOLIC AC 28MG-0.8MG
TABLET ORAL
COMMUNITY

## 2023-01-23 RX ORDER — PYRIDOXINE HCL (VITAMIN B6) 50 MG
50 TABLET ORAL DAILY
Qty: 30 TABLET | Refills: 3 | Status: SHIPPED | OUTPATIENT
Start: 2023-01-23 | End: 2023-05-23

## 2023-01-23 NOTE — PROGRESS NOTES
Abiodun Lara  2023    YOB: 1990          The patient was seen today. She is here regarding missed menses. LMP 2022. + UPT at home 3 weeks ago. . Taking PNVs. C/o n/v . Her bowels are regular and she is voiding without difficulty.      HPI:  Abiodun Lara is a 27 y.o. female  missed menses, n/v      OB History    Para Term  AB Living   2 0 0 0 1 0   SAB IAB Ectopic Molar Multiple Live Births   0 0 0 0 0 0      # Outcome Date GA Lbr Chucky/2nd Weight Sex Delivery Anes PTL Lv   2             1 AB                Past Medical History:   Diagnosis Date    Asthma     History of abnormal cervical Pap smear     Hx of blood clots     rt shoulder       Past Surgical History:   Procedure Laterality Date    COLPOSCOPY      SHOULDER SURGERY Left 2020    SHOULDER LESION BIOPSY EXCISION performed by Claritza Pritchard MD at [address] [place] [address] Road OR       Family History   Problem Relation Age of Onset    No Known Problems Mother     No Known Problems Father        Social History     Socioeconomic History    Marital status: Single     Spouse name: Not on file    Number of children: Not on file    Years of education: Not on file    Highest education level: Not on file   Occupational History    Not on file   Tobacco Use    Smoking status: Never    Smokeless tobacco: Never   Vaping Use    Vaping Use: Never used   Substance and Sexual Activity    Alcohol use: Yes     Comment: 3x/week    Drug use: Never    Sexual activity: Yes   Other Topics Concern    Not on file   Social History Narrative    Not on file     Social Determinants of Health     Financial Resource Strain: Not on file   Food Insecurity: Not on file   Transportation Needs: Not on file   Physical Activity: Not on file   Stress: Not on file   Social Connections: Not on file   Intimate Partner Violence: Not on file   Housing Stability: Not on file         MEDICATIONS:  Current Outpatient Medications   Medication Sig Dispense Refill Prenatal Vit-Fe Fumarate-FA (PRENATAL VITAMINS) 28-0.8 MG TABS Take by mouth      doxyLAMINE succinate (GNP SLEEP AID) 25 MG tablet Take 1 tablet by mouth nightly for 14 days 14 tablet 0    pyridoxine (RA VITAMIN B-6) 50 MG tablet Take 1 tablet by mouth daily 30 tablet 3     No current facility-administered medications for this visit. ALLERGIES:  Allergies as of 01/23/2023    (No Known Allergies)         REVIEW OF SYSTEMS:    see problem list   A minimum of an eleven point review of systems was completed. Review Of Systems (11 point):  Constitutional: No fever, chills or malaise; No weight change or fatigue  Head and Eyes: No vision, Headache, Dizziness or trauma in last 12 months  ENT ROS: No hearing, Tinnitis, sinus or taste problems  Hematological and Lymphatic ROS:No Lymphoma, Von Willebrand's, Hemophillia or Bleeding History  Psych ROS: No Depression, Homicidal thoughts,suicidal thoughts, or anxiety  Breast ROS: No prior breast abnormalities or lumps  Respiratory ROS: No SOB, Pneumoniae,Cough, or Pulmonary Embolism History  Cardiovascular ROS: No Chest Pain with Exertion, Palpitations, Syncope, Edema, Arrhythmia  Gastrointestinal ROS: No Indigestion, Heartburn, Nausea, vomiting, Diarrhea, Constipation,or Bowel Changes; No Bloody Stools or melena  Genito-Urinary ROS: No Dysuria, Hematuria or Nocturia. No Urinary Incontinence or Vaginal Discharge  Musculoskeletal ROS: No Arthralgia, Arthritis,Gout,Osteoporosis or Rheumatism  Neurological ROS: No CVA, Migraines, Epilepsy, Seizure Hx, or Limb Weakness  Dermatological ROS: No Rash, Itching, Hives, Mole Changes or Cancer          Blood pressure 114/72, height 5' 6\" (1.676 m), weight 137 lb (62.1 kg), last menstrual period 12/02/2022, not currently breastfeeding. Chaperone for Intimate Exam  Chaperone was offered and accepted as part of the rooming process. Chaperone: NA         Abdomen: Soft non-tender; good bowel sounds.  No guarding, rebound or rigidity. No CVA tenderness bilaterally. Extremities: No calf tenderness, DTR 2/4, and No edema bilaterally    Pelvic: Exam deferred. Diagnostics:  No results found. Lab Results:  Results for orders placed or performed during the hospital encounter of 12/22/20   Surgical Pathology   Result Value Ref Range    Surgical Pathology Report       [id number]  [address]  [address] [place].  [phone]  Fax: [phone]  SURGICAL PATHOLOGY REPORT    Patient Name: Jaswant Haji  MR#: [id number]  Specimen #[id number]       Final Diagnosis  SOFT TISSUE, LEFT SHOULDER:         MATURE ADIPOSE TISSUE CONSISTENT WITH LIPOMA      Ligia Maynard. Merary Elmore D.O.  **Electronically Signed Out**         js/12/24/2020    Clinical Information  Left shoulder lipoma, lesion biopsy excision; formalin time: 11:06    Source:  A: Lipoma left shoulder    Gross Description  Received in formalin labeled \"left shoulder lipoma\" is a portion of  yellow fatty tissue measuring 5 x 4.5 x 2 cm. On sectioning through  the specimen, the cut surface is yellow and homogenous. No zones of  hemorrhage or necrosis are identified. Representative sections are  submitted in four cassettes. GEOVANNA/mavis         Microscopic Description  Microscopic examination of four H&E slides confirms the diagnosis. POCT HCG, Prenancy, Ur   Result Value Ref Range    HCG, Pregnancy Urine (POC) NEGATIVE NEGATIVE    - NOT REPORTED          Assessment:   Diagnosis Orders   1. Missed menses  HCG, Quantitative, Pregnancy      2. Nausea and vomiting, unspecified vomiting type          Patient Active Problem List    Diagnosis Date Noted    Acute deep vein thrombosis (DVT) of axillary vein of right upper extremity ([facility] [address] 04/28/2019     Priority: Medium    Lipoma of left upper extremity            PLAN:  Return in about 2 weeks (around 2/6/2023) for intake .   Rx unsiom and b6  Lab slip given  Continue PNVs  Referred to hematology for hx DVT   Repeat Annual every 1 year  Cervical Cytology Evaluation begins at 24years old. If Negative Cytology, Follow-up screening per current guidelines. Return to the office in 2 weeks. Counseled on preventative health maintenance follow-up. Orders Placed This Encounter   Procedures    HCG, Quantitative, Pregnancy     Standing Status:   Future     Standing Expiration Date:   1/23/2024           The patient, Sheryle Stairs is a 27 y.o. female, was seen with a total time spent of 30 minutes for the visit on this date of service by the E/M provider. The time component had both face to face and non face to face time spent in determining the total time component. Counseling and education regarding her diagnosis listed below and her options regarding those diagnoses were also included in determining her time component. Diagnosis Orders   1. Missed menses  HCG, Quantitative, Pregnancy      2. Nausea and vomiting, unspecified vomiting type             The patient had her preventative health maintenance recommendations and follow-up reviewed with her at the completion of her visit.

## 2023-01-24 ENCOUNTER — TELEPHONE (OUTPATIENT)
Dept: OBGYN CLINIC | Age: 33
End: 2023-01-24

## 2023-01-24 NOTE — TELEPHONE ENCOUNTER
Per Melani Acosta NP, pt notified of +quant; LMP 12/2/22. Pt currently taking PNV. Call sent to [facility] to schedule new OB intake/US. Pt verbalized understanding.

## 2023-01-24 NOTE — TELEPHONE ENCOUNTER
----- Message from MEAGAN Araujo NP sent at 1/23/2023  3:25 PM EST -----  + quant  Patient will need scheduled for NOB

## 2023-02-07 DIAGNOSIS — Z34.90 EARLY STAGE OF PREGNANCY: Primary | ICD-10-CM

## 2023-02-08 ENCOUNTER — PROCEDURE VISIT (OUTPATIENT)
Dept: OBGYN CLINIC | Age: 33
End: 2023-02-08
Payer: COMMERCIAL

## 2023-02-08 ENCOUNTER — INITIAL PRENATAL (OUTPATIENT)
Dept: OBGYN CLINIC | Age: 33
End: 2023-02-08

## 2023-02-08 VITALS
BODY MASS INDEX: 21.79 KG/M2 | DIASTOLIC BLOOD PRESSURE: 74 MMHG | SYSTOLIC BLOOD PRESSURE: 106 MMHG | WEIGHT: 135 LBS | HEART RATE: 75 BPM

## 2023-02-08 DIAGNOSIS — O09.521 MULTIGRAVIDA OF ADVANCED MATERNAL AGE IN FIRST TRIMESTER: ICD-10-CM

## 2023-02-08 DIAGNOSIS — Z86.718 HX OF DEEP VENOUS THROMBOSIS: Primary | ICD-10-CM

## 2023-02-08 DIAGNOSIS — Z34.90 EARLY STAGE OF PREGNANCY: ICD-10-CM

## 2023-02-08 DIAGNOSIS — Z86.718 HISTORY OF DVT IN ADULTHOOD: ICD-10-CM

## 2023-02-08 DIAGNOSIS — Z98.890 HISTORY OF LOOP ELECTRICAL EXCISION PROCEDURE (LEEP): ICD-10-CM

## 2023-02-08 DIAGNOSIS — O09.91 SUPERVISION OF HIGH RISK PREGNANCY IN FIRST TRIMESTER: Primary | ICD-10-CM

## 2023-02-08 DIAGNOSIS — Z3A.10 10 WEEKS GESTATION OF PREGNANCY: ICD-10-CM

## 2023-02-08 LAB
CRL: NORMAL
SAC DIAMETER: NORMAL

## 2023-02-08 PROCEDURE — 76801 OB US < 14 WKS SINGLE FETUS: CPT | Performed by: OBSTETRICS & GYNECOLOGY

## 2023-02-08 PROCEDURE — 0501F PRENATAL FLOW SHEET: CPT | Performed by: NURSE PRACTITIONER

## 2023-02-08 NOTE — PROGRESS NOTES
Patient presents to office for OB intake, nurse visit only. Intake completed following ultrasound in office to confirm pregnancy dating/viability. Based on ultrasound, patient is currently 10w1d  gestation, Estimated Date of Delivery: 9/5/23. Patient presents to intake Alone. This was a planned pregnancy. Patient currently taking has a current medication list which includes the following prescription(s): ventolin hfa, prenatal vitamins, and pyridoxine. . Patient's medical, surgical, obstetrical and family history reviewed. See EHR for details. Patient prenatal lab work given to patient at this visit as well as OB education material. New OB consent forms signed. Patient accepted first trimester screening. Cystic Fibrosis screening ordered. Referral placed to [facility] for first trimester screen, AMA, hx of DVT and LEEP. Patient scheduled for follow up OB appointment with Minal Javier NP on 2/28/23. Patient instructed to complete lab work prior to follow up OB appointment.

## 2023-02-09 PROBLEM — O09.529 AMA (ADVANCED MATERNAL AGE) MULTIGRAVIDA 35+: Status: ACTIVE | Noted: 2023-02-09

## 2023-02-14 ENCOUNTER — HOSPITAL ENCOUNTER (OUTPATIENT)
Age: 33
Discharge: HOME | End: 2023-02-14
Payer: COMMERCIAL

## 2023-02-14 ENCOUNTER — INITIAL CONSULT (OUTPATIENT)
Dept: ONCOLOGY | Age: 33
End: 2023-02-14
Payer: COMMERCIAL

## 2023-02-14 VITALS
SYSTOLIC BLOOD PRESSURE: 115 MMHG | DIASTOLIC BLOOD PRESSURE: 77 MMHG | WEIGHT: 137.3 LBS | TEMPERATURE: 97.3 F | HEART RATE: 73 BPM | BODY MASS INDEX: 22.07 KG/M2 | HEIGHT: 66 IN

## 2023-02-14 DIAGNOSIS — Z86.718 HISTORY OF DVT (DEEP VEIN THROMBOSIS): ICD-10-CM

## 2023-02-14 DIAGNOSIS — Z86.718 HISTORY OF DVT (DEEP VEIN THROMBOSIS): Primary | ICD-10-CM

## 2023-02-14 PROCEDURE — 81291 MTHFR GENE: CPT

## 2023-02-14 PROCEDURE — G8427 DOCREV CUR MEDS BY ELIG CLIN: HCPCS | Performed by: INTERNAL MEDICINE

## 2023-02-14 PROCEDURE — 86147 CARDIOLIPIN ANTIBODY EA IG: CPT

## 2023-02-14 PROCEDURE — 86146 BETA-2 GLYCOPROTEIN ANTIBODY: CPT

## 2023-02-14 PROCEDURE — 85730 THROMBOPLASTIN TIME PARTIAL: CPT

## 2023-02-14 PROCEDURE — 85610 PROTHROMBIN TIME: CPT

## 2023-02-14 PROCEDURE — 36415 COLL VENOUS BLD VENIPUNCTURE: CPT

## 2023-02-14 PROCEDURE — 81240 F2 GENE: CPT

## 2023-02-14 PROCEDURE — G8484 FLU IMMUNIZE NO ADMIN: HCPCS | Performed by: INTERNAL MEDICINE

## 2023-02-14 PROCEDURE — G8420 CALC BMI NORM PARAMETERS: HCPCS | Performed by: INTERNAL MEDICINE

## 2023-02-14 PROCEDURE — 81241 F5 GENE: CPT

## 2023-02-14 PROCEDURE — 85613 RUSSELL VIPER VENOM DILUTED: CPT

## 2023-02-14 PROCEDURE — 99244 OFF/OP CNSLTJ NEW/EST MOD 40: CPT | Performed by: INTERNAL MEDICINE

## 2023-02-14 NOTE — PROGRESS NOTES
Patient ID: Roxana Campbell, 1990, [id number], 27 y.o. Referred by : MEAGAN Rosa CNP  Reason for consultation:   History of right upper extremity DVT in 2019  HISTORY OF PRESENT ILLNESS:    Hematologic history:    Roxana Campbell is a 27 y.o. female was seen during initial consultation visit for history of right upper extremity DVT, appears unprovoked. Patient had her first episode of DVT in March 2019 when she was in [place]. She reports that she was very active at that time doing some exercise classes and Pilates and presented with right upper extremity swelling and her ultrasound Doppler on 3/31/2019 showed right upper DVT in the subclavian vein. She also had a CT of the chest on that day which showed several bilateral segmental and subsegmental pulmonary emboli. She was treated with 6 months of anticoagulation with Xarelto. She was following with vascular surgery there in [place] [address] and subsequently her anticoagulation was discontinued. She never had thrombophilia work-up at that time and her DVT was thought secondary to her vigorous exercise. She denies any personal history of miscarriages in the first trimester. Currently she is pregnant with first trimester and expected date of delivery on September 5. She was referred to hematology for her history of DVT in the past.  She report history of grandfather having CVA in 46s. Also history of multiple miscarriages in her paternal cousin. No history of lupus or any autoimmune conditions in the family. She is very active here and now moved to [facility] in 2020. She denies any history of smoking or alcohol abuse.     Past Medical History:   Diagnosis Date    Asthma     History of abnormal cervical Pap smear     Hx of blood clots 2019    rt shoulder       Past Surgical History:   Procedure Laterality Date    COLPOSCOPY      SHOULDER SURGERY Left 12/22/2020    SHOULDER LESION BIOPSY EXCISION performed by Neo Francois MD at [address] No Known Allergies    Current Outpatient Medications   Medication Sig Dispense Refill    VENTOLIN  (90 Base) MCG/ACT inhaler inhale 1 TO 2 puffs by mouth every 4 hours      Prenatal Vit-Fe Fumarate-FA (PRENATAL VITAMINS) 28-0.8 MG TABS Take by mouth      pyridoxine (RA VITAMIN B-6) 50 MG tablet Take 1 tablet by mouth daily 30 tablet 3     No current facility-administered medications for this visit. Social History     Socioeconomic History    Marital status: Single     Spouse name: Not on file    Number of children: Not on file    Years of education: Not on file    Highest education level: Not on file   Occupational History    Not on file   Tobacco Use    Smoking status: Never    Smokeless tobacco: Never   Vaping Use    Vaping Use: Never used   Substance and Sexual Activity    Alcohol use: Not Currently     Comment: 3x/week    Drug use: Never    Sexual activity: Yes   Other Topics Concern    Not on file   Social History Narrative    Not on file     Social Determinants of Health     Financial Resource Strain: Not on file   Food Insecurity: Not on file   Transportation Needs: Not on file   Physical Activity: Not on file   Stress: Not on file   Social Connections: Not on file   Intimate Partner Violence: Not on file   Housing Stability: Not on file       Family History   Problem Relation Age of Onset    Thyroid Disease Paternal Grandmother     Stroke Maternal Grandmother     No Known Problems Father     No Known Problems Mother     Thyroid Disease Paternal Aunt     COPD Maternal Aunt       REVIEW OF SYSTEM:     Constitutional: No fever or chills.  No night sweats, no weight loss   Eyes: No eye discharge, double vision, or eye pain   HEENT: negative for sore mouth, sore throat, hoarseness and voice change   Respiratory: negative for cough , sputum, dyspnea, wheezing, hemoptysis, chest pain   Cardiovascular: negative for chest pain, dyspnea, palpitations, orthopnea, PND   Gastrointestinal: negative for nausea, vomiting, diarrhea, constipation, abdominal pain, Dysphagia, hematemesis and hematochezia   Genitourinary: negative for frequency, dysuria, nocturia, urinary incontinence, and hematuria   Integument: negative for rash, skin lesions, bruises.    Hematologic/Lymphatic: negative for easy bruising, bleeding, lymphadenopathy, petechiae and swelling/edema   Endocrine: negative for heat or cold intolerance, tremor, weight changes, change in bowel habits and hair loss   Musculoskeletal: negative for myalgias, arthralgias, pain, joint swelling,and bone pain   Neurological: negative for headaches, dizziness, seizures, weakness, numbness       OBJECTIVE:         Vitals:    02/14/23 0855   BP: 115/77   Pulse: 73   Temp: 97.3 °F (36.3 °C)       PHYSICAL EXAM:   General appearance - well appearing, no in pain or distress   Mental status - alert and cooperative   Eyes - pupils equal and reactive, extraocular eye movements intact   Ears - bilateral TM's and external ear canals normal   Mouth - mucous membranes moist, pharynx normal without lesions   Neck - supple, no significant adenopathy   Lymphatics - no palpable lymphadenopathy, no hepatosplenomegaly   Chest - clear to auscultation, no wheezes, rales or rhonchi, symmetric air entry   Heart - normal rate, regular rhythm, normal S1, S2, no murmurs, rubs, clicks or gallops   Abdomen - soft, nontender, nondistended, no masses or organomegaly   Neurological - alert, oriented, normal speech, no focal findings or movement disorder noted   Musculoskeletal - no joint tenderness, deformity or swelling   Extremities - peripheral pulses normal, no pedal edema, no clubbing or cyanosis   Skin - normal coloration and turgor, no rashes, no suspicious skin lesions noted ,      LABORATORY DATA:     Lab Results   Component Value Date    WBC 7.9 09/13/2020    HGB 13.4 09/13/2020    HCT 40.0 09/13/2020    MCV 92.0 09/13/2020     09/13/2020    LYMPHOPCT 16 (L) 09/13/2020    RBC 4.35 2020    MCH 30.8 2020    MCHC 33.5 2020    RDW 12.7 2020    MONOPCT 10 2020    BASOPCT 0 2020    NEUTROABS 5.70 2020    LYMPHSABS 1.30 2020    MONOSABS 0.80 2020    EOSABS 0.10 2020    BASOSABS 0.00 2020         Chemistry        Component Value Date/Time     2020 1409    K 4.0 2020 1409     2020 1409    CO2 26 2020 1409    BUN 14 2020 1409    CREATININE 0.70 2020 1409        Component Value Date/Time    CALCIUM 9.0 2020 1409        PATHOLOGY DATA:   Reviewed   IMAGING DATA:      US OB LESS THAN 14 WEEKS SINGLE OR FIRST GESTATION  [facility]  [facility]; [address]  [address]  [phone] [place]n [phone] Fax    FIRST TRIMESTER ULTRASOUND REPORT    EXAMINATION:  PELVIC ULTRASOUND-First Trimester (<14 weeks)    2023    MRN: [id number]  Contact Serial #: [id number]    Florinda Rivera  YOB: 1990  Age: 27 y.o.     Performed By: Kavya Colindres RDMS  Attending: Amara Barrett DO  Referred By: Amara Barrett[place] [address], [address] [place] [address]    Services Provided:  First Trimester Ultrasound (<14 weeks)    Specific Ultrasound Imaging Technique Utilized:  Transabdominal Approach: Yes  Transvaginal Approach: No    Comparison:  none    HISTORY & INDICATION:    History:  Florinda Rivera is a 27 y.o. female     EGA: 10w1d    OB History     T0    L0    SAB0  IAB0  Ectopic0  Molar0  Multiple0  Live Births0     Name of Baby 1: Not recorded    Date: Not recorded     GA: Not recorded     Delivery: Not recorded    Apgar1: Not recorded     Suni Garcia: Not recorded    Living: Not recorded    Name of Baby 2: Not recorded    Date: Not recorded     GA: Not recorded     Delivery: Not recorded    Apgar1: Not recorded     Apgar5: Not recorded    Living: Not recorded    Indication/Reason for imaging exam:  1st trimester dating/viability  Early stage of pregnancy    Summary:  The images were reviewed. The Ultrasound report is scanned and attached for specific findings and measurements. Patient's last menstrual period was 12/02/2022. Gest Age     EDC  LMP:           9w5d     09/08/2023  (BEST)    Ultrasound Today:  10w1d +/-6d   09/05/2023     IMPRESSION:  1. There is  a Single viable intrauterine pregnancy. A yolk sac is  identified. 2. Pregnancy dating (NIA) was completed today and confirmed by ultrasound. 3. CRL is 3.18 cm and FHR is confirmed at 169 bpm    4. The patient was counseled on the incidents of birth defects in the general population is 2-4% and that ultrasound does not diagnose every form of congenital abnormality and has limitations. The patient was made fully aware and understands that the only way to evaluate the chromosomal makeup to near 590% certainty is with invasive testing such as chorionic villous sampling or amniocentesis. These options were reviewed in detail with all risks/benefits and alternatives. NIPT testing was also reviewed with the patient, taking a maternal blood sample and screening it for fetal cells then performing a genetic karyotype. She was made aware that if this were to be positive for a trisomy then a confirmatory amniocentesis or CVS for confirmation would be needed. The patient Requested an [facility] referral to further discuss testing. TOPSTOH guidelines were reviewed with the patient. 5. There were not any adnexal masses     6. AMA counseling-see below    RECOMMENDATIONS:  Follow up with Maternal Fetal Medicine for nuchal translucency testing and or NIPT testing per patients request and M recommendations. A Follow-up ultrasound is recommended with Maternal Fetal Medicine at 18-20 weeks for a detailed fetal anatomical survey and transvaginal imaging for cervical length.     Advanced Maternal Age Counseling    If a woman is over the age of 28, she is considered to be of Advanced Maternal Age, and with this title comes risks for mom and baby. Increased risk of Down Syndrome - the most common chromosomal birth defect (chromosomes - cells that carry genes and transmit heredity information)   Increased risk of miscarriage   20% increase at ages 28 to 44   35% increase at ages 42-38   Over 50% increase by age 39  Increased risk of  Section () for delivery method   Gestational diabetes - diabetes that develops for the first time during pregnancy. Women who have this could possibly have a very large baby who then is at risk for injuries during delivery. Pregnancy Induced Hypertension - High blood pressure   Placental Problems - one of the most common placental problems is placenta previa in which the placenta covers all or part of the uterine opening (cervix). This can cause severe bleeding during delivery and can make  delivery necessary. Even with all of these increased risks, with the advancement in medical procedures and testing, there are several ways to reduce your risk. They include early and regular prenatal care, taking the multivitamin with folic acid prescribed by your health care manager, beginning pregnancy at a healthy weight, not smoking or drinking alcoholic beverages, and eating healthy foods. There are tests that all pregnant women are encouraged to take, but there is additional prenatal testing that is regularly offered to any woman 28 or older because of the potential of increased risks to the mother and baby. These tests are chorionic villus sampling (CVS) and amniocentesis. NIPT is also available which is a non-invasive option for fetal karyotyping. With CVS, a small sample of cells (called chorionic villi) is taken from the placenta where it attached to the wall of the uterus. Chorionic villi are tiny parts of the placenta; therefore they have the same genes as the baby.  This test is 98% accurate, but can carry a slightly higher risk of miscarriage than amniocentesis, since the procedure is done in early pregnancy.   Amniocentesis is a procedure where a sample of fluid is removed from the amniotic sac for analysis and evaluation. During this procedure, fluid is removed by placing a long needle through the abdominal wall into the amniotic sac. The amniocentesis needle is typically guided into the sac with the help of ultrasound imaging. Once the needle is in the sac, a syringe is used to withdraw the clear ebenezer-colored amniotic fluid, which looks a bit like urine.  NIPT, utilizes the maternal blood to test for fetal cells. These fetal cells are then karyotyped for genetic evaluation.  All of these tests, CVS, NIPT and amniocentesis, let couples know if the fetus will have genetic abnormalities, and will help them make informed decisions regarding their pregnancy. Karyotyping by either CVS, NIPT or Amniocentesis is the ONLY way to confirm the genetic chromosomal structure regarding trisomy; Down's Syndrome, Edward's or Pateau's. [facility] was reviewed. If NIPT is positive then a confirmatory amniocentesis would be recommended to confirm the diagnosis.  [facility] guidelines were reviewed.    Continue with Obstetrical visits as scheduled    The above findings and recommendations were reviewed with the patient today. She is amenable to the plan of care.    Electronically signed by Herrera Young DO on 2/8/23 at 10:51 PM EST  CT chest 3/31/2019  FINDINGS:     VESSELS: There is good opacification of the pulmonary arteries.   Evaluation is limited by respiratory motion artifact.     Small filling defects seen at the subsegmental levels of arterials   supplying the right lower lobe. Series 6 image 175, 181, 182, 193.   Filling defects seen at the subsegmental levels of arterials   supplying the left lower lobe. Series 6 image 163, 180, 151   There is no evidence of filling defect to suggest pulmonary embolism   within the main pulmonary trunk  and right and left pulmonary arteries. LUNGS/AIRWAYS: No pulmonary nodules, masses, or areas of   consolidation. No endobronchial lesion. PLEURA: No pleural effusion. HEART: Normal heart size, there is no evidence of intraventricular   septum straightening or right heart strain. No pericardial effusion. MEDIASTINUM: No lymphadenopathy. CHEST WALL: There are scattered bilateral axillary lymph nodes not   meeting size criteria for enlargement   BONES: Normal.     UPPER ABDOMEN: Normal.       IMPRESSION   IMPRESSION:     Scattered bilateral pulmonary emboli in the subsegmental branches   supplying the bilateral lower lobes. There is no evidence of heart   strain. FINDINGS:     RIGHT UPPER EXTREMITY:     INTERNAL JUGULAR VEIN: Normal color flow, compression, and   transmitted cardiac pulsation. SUBCLAVIAN VEIN: Hypoechoic intraluminal thrombus is seen at the   central right subclavian vein with associated dilatation of the vein   and reduced color Doppler flow, consistent with acute thrombus. AXILLARY VEIN: Normal color flow, compression, and transmitted   cardiac pulsation. BRACHIAL VEIN: Normal color flow and compression. IMPRESSION   IMPRESSION: Acute deep venous thrombosis of the central right   subclavian vein       ASSESSMENT:    Saida Frederick is a 27 y.o. female with a history of DVT in March 2019 in the right upper extremity and currently pregnant in her first trimester. I reviewed the laboratory data, imaging studies, outside records, discussed diagnosis, prognosis and treatment recommendations. It appears that her first right approximate DVT is possibly most likely unprovoked although her vigorous exercise at that time may have affected some circulation and cannot be ruled out. She never had hypercoagulable work-up done at that time and was treated with 6 months of anticoagulation. I will order thrombophilia work-up.     I advised her to follow-up with her GYN for regular checkups. Patient's questions were sought and answered to her satisfaction and she agreed to proceed with the plan. PLAN:   I will order thrombophilia work-up now  Return to clinic in 4 to 5 weeks to discuss lab results and further recommendations      Kevin Ogden MD  Hematologist/Medical Oncologist    On this date 2/14/23  I have spent 60 minutes reviewing previous notes, test results and face to face with the patient discussing the diagnosis and importance of compliance with the treatment plan. Greater than 50% of that time was spent face-to-face with the patient in counseling and coordinating her care. This note is created with the assistance of a speech recognition program.  While intending to generate a document that actually reflects the content of the visit, the document can still have some errors including those of syntax and sound a like substitutions which may escape proof reading. It such instances, actual meaning can be extrapolated by contextual diversion.

## 2023-02-15 ENCOUNTER — TELEPHONE (OUTPATIENT)
Dept: ONCOLOGY | Age: 33
End: 2023-02-15

## 2023-02-15 ENCOUNTER — HOSPITAL ENCOUNTER (OUTPATIENT)
Age: 33
Discharge: HOME | End: 2023-02-15
Payer: COMMERCIAL

## 2023-02-15 DIAGNOSIS — Z34.90 EARLY STAGE OF PREGNANCY: ICD-10-CM

## 2023-02-15 DIAGNOSIS — Z3A.10 10 WEEKS GESTATION OF PREGNANCY: ICD-10-CM

## 2023-02-15 LAB
ABO/RH: NORMAL
ABSOLUTE EOS #: 0.1 K/UL (ref 0–0.4)
ABSOLUTE LYMPH #: 1.6 K/UL (ref 1–4.8)
ABSOLUTE MONO #: 0.6 K/UL (ref 0.1–1.3)
ANTIBODY SCREEN: NEGATIVE
B2 GLYCOPROT1 IGA SERPL IA-ACNC: 3.5 ELISA U/ML (ref 0–7)
B2 GLYCOPROT1 IGG SERPL IA-ACNC: <0.6 ELISA U/ML (ref 0–7)
B2 GLYCOPROT1 IGM SERPL IA-ACNC: <0.9 ELISA U/ML (ref 0–7)
BACTERIA: ABNORMAL
BASOPHILS # BLD: 0 % (ref 0–2)
BASOPHILS ABSOLUTE: 0 K/UL (ref 0–0.2)
BILIRUBIN URINE: NEGATIVE
BLOOD BANK COMMENT: NORMAL
CARDIOLIPIN IGA SER IA-ACNC: 1.9 APL (ref 0–14)
CARDIOLIPIN IGG SER IA-ACNC: 0.6 GPL (ref 0–10)
CARDIOLIPIN IGM SER IA-ACNC: 1.9 MPL (ref 0–10)
CASTS UA: ABNORMAL /LPF
COLOR: YELLOW
DILUTE RUSSELL VIPER VENOM TIME: NORMAL
EOSINOPHILS RELATIVE PERCENT: 1 % (ref 0–4)
EPITHELIAL CELLS UA: ABNORMAL /HPF
GLUCOSE SERPL-MCNC: 80 MG/DL (ref 70–99)
GLUCOSE UR STRIP.AUTO-MCNC: NEGATIVE MG/DL
HBV SURFACE AG SER QL: NONREACTIVE
HCT VFR BLD AUTO: 38.5 % (ref 36–46)
HGB BLD-MCNC: 13.2 G/DL (ref 12–16)
HIV 1+2 AB+HIV1 P24 AG SERPL QL IA: NONREACTIVE
INR PPP: 1
KETONES UR STRIP.AUTO-MCNC: NEGATIVE MG/DL
LEUKOCYTE ESTERASE UR QL STRIP.AUTO: ABNORMAL
LUPUS ANTICOAG: NORMAL
LYMPHOCYTES # BLD: 19 % (ref 24–44)
MCH RBC QN AUTO: 30.6 PG (ref 26–34)
MCHC RBC AUTO-ENTMCNC: 34.2 G/DL (ref 31–37)
MCV RBC AUTO: 89.5 FL (ref 80–100)
MONOCYTES # BLD: 8 % (ref 1–7)
NITRITE UR QL STRIP.AUTO: NEGATIVE
PARTIAL THROMBOPLASTIN TIME: 24.1 SEC (ref 21.3–31.3)
PDW BLD-RTO: 13 % (ref 11.5–14.9)
PLATELET # BLD AUTO: 187 K/UL (ref 150–450)
PMV BLD AUTO: 7.9 FL (ref 6–12)
PROT UR STRIP.AUTO-MCNC: 6 MG/DL (ref 5–8)
PROT UR STRIP.AUTO-MCNC: NEGATIVE MG/DL
PROTHROMBIN TIME: 10.4 SEC (ref 9.4–12.6)
RBC # BLD: 4.3 M/UL (ref 4–5.2)
RBC CLUMPS #/AREA URNS AUTO: ABNORMAL /HPF
RUBV IGG SER QL: 174.3 IU/ML
SEG NEUTROPHILS: 72 % (ref 36–66)
SEGMENTED NEUTROPHILS ABSOLUTE COUNT: 6.1 K/UL (ref 1.3–9.1)
SPECIFIC GRAVITY UA: 1.01 (ref 1–1.03)
TSH SERPL-ACNC: 1.81 UIU/ML (ref 0.3–5)
TURBIDITY: CLEAR
URINE HGB: NEGATIVE
UROBILINOGEN, URINE: NORMAL
WBC # BLD AUTO: 8.4 K/UL (ref 3.5–11)
WBC UA: ABNORMAL /HPF

## 2023-02-15 PROCEDURE — 86900 BLOOD TYPING SEROLOGIC ABO: CPT

## 2023-02-15 PROCEDURE — 85025 COMPLETE CBC W/AUTO DIFF WBC: CPT

## 2023-02-15 PROCEDURE — 82947 ASSAY GLUCOSE BLOOD QUANT: CPT

## 2023-02-15 PROCEDURE — 87340 HEPATITIS B SURFACE AG IA: CPT

## 2023-02-15 PROCEDURE — 86777 TOXOPLASMA ANTIBODY: CPT

## 2023-02-15 PROCEDURE — 86762 RUBELLA ANTIBODY: CPT

## 2023-02-15 PROCEDURE — 86850 RBC ANTIBODY SCREEN: CPT

## 2023-02-15 PROCEDURE — 86778 TOXOPLASMA ANTIBODY IGM: CPT

## 2023-02-15 PROCEDURE — 84443 ASSAY THYROID STIM HORMONE: CPT

## 2023-02-15 PROCEDURE — 86780 TREPONEMA PALLIDUM: CPT

## 2023-02-15 PROCEDURE — 86901 BLOOD TYPING SEROLOGIC RH(D): CPT

## 2023-02-15 PROCEDURE — 36415 COLL VENOUS BLD VENIPUNCTURE: CPT

## 2023-02-15 PROCEDURE — 81001 URINALYSIS AUTO W/SCOPE: CPT

## 2023-02-15 PROCEDURE — 87389 HIV-1 AG W/HIV-1&-2 AB AG IA: CPT

## 2023-02-15 NOTE — TELEPHONE ENCOUNTER
AVS from 2/14/23      Labs today  RTc 4 weeks      Labs drawn today     Rv scheduled for 3/14 @ 8:45 am     Pt was given AVS and appointment schedule    Electronically signed by Timmy Justin on 2/15/2023 at 8:32 AM

## 2023-02-16 LAB
T PALLIDUM AB SER QL IA: NONREACTIVE
TOXOPLASM IGM: 0.42 INDEX
TOXOPLASMA BLOOD FOR RATIO: <0.5 IU/ML

## 2023-02-17 LAB
MTHFR INTERPRETATION: NORMAL
MTHFR MUTATION A1286C: NORMAL
MTHFR MUTATION C665T: NORMAL
SPECIMEN: NORMAL

## 2023-02-18 LAB
PROTHROMBIN G20210A MUTATION: NEGATIVE
SPECIMEN SOURCE: NORMAL

## 2023-02-19 LAB
FACTOR V MUTATION: NEGATIVE
SPECIMEN: NORMAL

## 2023-02-21 ENCOUNTER — ROUTINE PRENATAL (OUTPATIENT)
Dept: PERINATAL CARE | Age: 33
End: 2023-02-21
Payer: COMMERCIAL

## 2023-02-21 ENCOUNTER — HOSPITAL ENCOUNTER (OUTPATIENT)
Age: 33
Discharge: HOME | End: 2023-02-21
Payer: COMMERCIAL

## 2023-02-21 VITALS
TEMPERATURE: 97.8 F | RESPIRATION RATE: 16 BRPM | DIASTOLIC BLOOD PRESSURE: 72 MMHG | SYSTOLIC BLOOD PRESSURE: 111 MMHG | WEIGHT: 136 LBS | BODY MASS INDEX: 21.86 KG/M2 | HEIGHT: 66 IN | HEART RATE: 68 BPM

## 2023-02-21 DIAGNOSIS — O99.511 ASTHMA AFFECTING PREGNANCY IN FIRST TRIMESTER: ICD-10-CM

## 2023-02-21 DIAGNOSIS — J45.909 ASTHMA AFFECTING PREGNANCY IN FIRST TRIMESTER: ICD-10-CM

## 2023-02-21 DIAGNOSIS — O34.40 HISTORY OF CERVICAL LEEP BIOPSY AFFECTING CARE OF MOTHER, ANTEPARTUM: ICD-10-CM

## 2023-02-21 DIAGNOSIS — Z36.9 FIRST TRIMESTER SCREENING: Primary | ICD-10-CM

## 2023-02-21 DIAGNOSIS — Z98.890 HISTORY OF CERVICAL LEEP BIOPSY AFFECTING CARE OF MOTHER, ANTEPARTUM: ICD-10-CM

## 2023-02-21 DIAGNOSIS — O09.521 MULTIGRAVIDA OF ADVANCED MATERNAL AGE IN FIRST TRIMESTER: ICD-10-CM

## 2023-02-21 DIAGNOSIS — Z3A.11 11 WEEKS GESTATION OF PREGNANCY: ICD-10-CM

## 2023-02-21 DIAGNOSIS — O36.80X0 ENCOUNTER TO DETERMINE FETAL VIABILITY OF PREGNANCY, SINGLE OR UNSPECIFIED FETUS: ICD-10-CM

## 2023-02-21 PROBLEM — O09.891 MATERNAL EXPOSURE TO ALCOHOL IN FIRST TRIMESTER: Status: ACTIVE | Noted: 2023-02-21

## 2023-02-21 LAB
CRL: NORMAL
SAC DIAMETER: NORMAL

## 2023-02-21 PROCEDURE — 76813 OB US NUCHAL MEAS 1 GEST: CPT | Performed by: OBSTETRICS & GYNECOLOGY

## 2023-02-21 PROCEDURE — 76801 OB US < 14 WKS SINGLE FETUS: CPT | Performed by: OBSTETRICS & GYNECOLOGY

## 2023-02-21 PROCEDURE — 99999 PR OFFICE/OUTPT VISIT,PROCEDURE ONLY: CPT | Performed by: OBSTETRICS & GYNECOLOGY

## 2023-02-21 PROCEDURE — 81508 FTL CGEN ABNOR TWO PROTEINS: CPT

## 2023-02-21 PROCEDURE — 36415 COLL VENOUS BLD VENIPUNCTURE: CPT

## 2023-02-24 LAB
AFP INTERPRETATION: NORMAL
AFP SPECIMEN: NORMAL
CRL: 51 MM
CROWN RUMP LENGTH TWIN B: NORMAL MM
CROWN RUMP LENGTH: 51
DATE OF BIRTH: NORMAL
DONOR EGG?: NORMAL
GESTATIONAL AGE: NORMAL
HISTORY OF ANEUPLOIDY?: NORMAL
IN VITRO FERTILIZATION: NORMAL
MATERNAL AGE AT EDD: 35.2 YR
MATERNAL SCREEN, EER: NORMAL
MATERNAL WEIGHT: 136
MOM FOR NT, TWIN B: NORMAL
MOM FOR NT: 0.91
MOM FOR PAPP-A: 0.57
MONOCHORIONIC TWINS: NORMAL
MSHCG-MOM: 0.74
MSHCG: NORMAL IU/L
NUCHAL TRANSLUC (NT): 1.2
NUCHAL TRANSLUC (NT): 1.2 MM
NUCHAL TRANSLUCENCY TWIN B: NORMAL MM
NUMBER OF FETUSES: 1
NUMBER OF FETUSES: NORMAL
PAPP-A MOM: 414.2 NG/ML
PATIENT WEIGHT UNITS: NORMAL
PATIENT WEIGHT: NORMAL
PREV TRISOMY PREG: NORMAL
RACE (MATERNAL): NORMAL
RACE: NORMAL
READING MD CERT NUM: NORMAL
READING MD NAME: NORMAL
REPEAT SPECIMEN?: NORMAL
SMOKING: NORMAL
SMOKING: NORMAL
SONOGRAPHER CERT NO: NORMAL
SONOGRAPHER CERT NO: NORMAL
SONOGRAPHER NAME: NORMAL
SONOGRAPHER NAME: NORMAL
ULTRASOUND DATE: NORMAL
ULTRASOUND DATE: NORMAL

## 2023-02-26 SDOH — ECONOMIC STABILITY: HOUSING INSECURITY
IN THE LAST 12 MONTHS, WAS THERE A TIME WHEN YOU DID NOT HAVE A STEADY PLACE TO SLEEP OR SLEPT IN A SHELTER (INCLUDING NOW)?: NO

## 2023-02-26 SDOH — ECONOMIC STABILITY: INCOME INSECURITY: HOW HARD IS IT FOR YOU TO PAY FOR THE VERY BASICS LIKE FOOD, HOUSING, MEDICAL CARE, AND HEATING?: PATIENT DECLINED

## 2023-02-26 SDOH — ECONOMIC STABILITY: FOOD INSECURITY: WITHIN THE PAST 12 MONTHS, YOU WORRIED THAT YOUR FOOD WOULD RUN OUT BEFORE YOU GOT MONEY TO BUY MORE.: NEVER TRUE

## 2023-02-26 SDOH — ECONOMIC STABILITY: FOOD INSECURITY: WITHIN THE PAST 12 MONTHS, THE FOOD YOU BOUGHT JUST DIDN'T LAST AND YOU DIDN'T HAVE MONEY TO GET MORE.: NEVER TRUE

## 2023-02-26 SDOH — ECONOMIC STABILITY: TRANSPORTATION INSECURITY
IN THE PAST 12 MONTHS, HAS LACK OF TRANSPORTATION KEPT YOU FROM MEETINGS, WORK, OR FROM GETTING THINGS NEEDED FOR DAILY LIVING?: NO

## 2023-02-28 ENCOUNTER — ROUTINE PRENATAL (OUTPATIENT)
Dept: OBGYN CLINIC | Age: 33
End: 2023-02-28

## 2023-02-28 ENCOUNTER — HOSPITAL ENCOUNTER (OUTPATIENT)
Age: 33
Setting detail: SPECIMEN
Discharge: HOME | End: 2023-02-28

## 2023-02-28 VITALS
WEIGHT: 141 LBS | BODY MASS INDEX: 22.76 KG/M2 | SYSTOLIC BLOOD PRESSURE: 120 MMHG | HEART RATE: 76 BPM | DIASTOLIC BLOOD PRESSURE: 78 MMHG

## 2023-02-28 DIAGNOSIS — O34.40 HISTORY OF LOOP ELECTROSURGICAL EXCISION PROCEDURE (LEEP) OF CERVIX AFFECTING PREGNANCY, ANTEPARTUM: ICD-10-CM

## 2023-02-28 DIAGNOSIS — O09.521 MULTIGRAVIDA OF ADVANCED MATERNAL AGE IN FIRST TRIMESTER: ICD-10-CM

## 2023-02-28 DIAGNOSIS — Z3A.12 12 WEEKS GESTATION OF PREGNANCY: ICD-10-CM

## 2023-02-28 DIAGNOSIS — O09.891 MATERNAL EXPOSURE TO ALCOHOL IN FIRST TRIMESTER: Primary | ICD-10-CM

## 2023-02-28 DIAGNOSIS — Z15.89 MTHFR MUTATION: ICD-10-CM

## 2023-02-28 DIAGNOSIS — J45.909 ASTHMA, UNSPECIFIED ASTHMA SEVERITY, UNSPECIFIED WHETHER COMPLICATED, UNSPECIFIED WHETHER PERSISTENT: ICD-10-CM

## 2023-02-28 DIAGNOSIS — Z15.89 MTHFR GENE MUTATION: ICD-10-CM

## 2023-02-28 DIAGNOSIS — Z98.890 HISTORY OF LOOP ELECTROSURGICAL EXCISION PROCEDURE (LEEP) OF CERVIX AFFECTING PREGNANCY, ANTEPARTUM: ICD-10-CM

## 2023-02-28 PROCEDURE — G8484 FLU IMMUNIZE NO ADMIN: HCPCS | Performed by: NURSE PRACTITIONER

## 2023-02-28 PROCEDURE — G8420 CALC BMI NORM PARAMETERS: HCPCS | Performed by: NURSE PRACTITIONER

## 2023-02-28 PROCEDURE — 0502F SUBSEQUENT PRENATAL CARE: CPT | Performed by: NURSE PRACTITIONER

## 2023-02-28 PROCEDURE — G8427 DOCREV CUR MEDS BY ELIG CLIN: HCPCS | Performed by: NURSE PRACTITIONER

## 2023-02-28 PROCEDURE — 1036F TOBACCO NON-USER: CPT | Performed by: NURSE PRACTITIONER

## 2023-02-28 RX ORDER — ASPIRIN 81 MG/1
81 TABLET, CHEWABLE ORAL DAILY
COMMUNITY

## 2023-02-28 NOTE — PROGRESS NOTES
Justin Roque  2023    YOB: 1990   Patient's last menstrual period was 2022 (exact date). 12w4d  [id number]      Primary Care Physician: Fariha Ndiaye MD        CC: Initial Prenatal Visit    Subjective:     Justin Roque is a 27 y.o. female     is being seen today for her first obstetrical visit. This is not a planned pregnancy. She is at 12w4d  Her obstetrical history is significant for advanced maternal age, hx of DVT and pulmonary embolism, hx of LEEP, MTHFR. Relationship with FOB: significant other, living together. Patient does intend to breast feed. Pregnancy history fully reviewed. Mother's ethnicity:      Objective:   Blood pressure 120/78, pulse 76, weight 141 lb (64 kg), last menstrual period 2022, not currently breastfeeding.     OB History    Para Term  AB Living   2 0 0 0 1 0   SAB IAB Ectopic Molar Multiple Live Births   0 0 0 0 0 0      # Outcome Date GA Lbr Chucky/2nd Weight Sex Delivery Anes PTL Lv   2 Current            1 AB                Past Medical History:   Diagnosis Date    Asthma     History of abnormal cervical Pap smear     Hx of blood clots 2019    rt shoulder     Past Surgical History:   Procedure Laterality Date    COLPOSCOPY      SHOULDER SURGERY Left 2020    SHOULDER LESION BIOPSY EXCISION performed by Jessi San MD at [address] [place] [address] History     Socioeconomic History    Marital status: Single     Spouse name: Not on file    Number of children: Not on file    Years of education: Not on file    Highest education level: Not on file   Occupational History    Not on file   Tobacco Use    Smoking status: Never    Smokeless tobacco: Never   Vaping Use    Vaping Use: Never used   Substance and Sexual Activity    Alcohol use: Not Currently     Comment: 3x/week    Drug use: Never    Sexual activity: Yes   Other Topics Concern    Not on file   Social History Narrative    Not on file     Social Determinants of Health     Financial Resource Strain: Not on file   Food Insecurity: Not on file   Transportation Needs: Not on file   Physical Activity: Not on file   Stress: Not on file   Social Connections: Not on file   Intimate Partner Violence: Not on file   Housing Stability: Not on file     Family History   Problem Relation Age of Onset    Thyroid Disease Paternal Grandmother     Stroke Maternal Grandmother     No Known Problems Father     No Known Problems Mother     Thyroid Disease Paternal Aunt     COPD Maternal Aunt        MEDICATIONS:  Current Outpatient Medications   Medication Sig Dispense Refill    aspirin 81 MG chewable tablet Take 81 mg by mouth daily      folic acid-pyridoxine-cyanocobalamine (FOLTX) 2.2-25-1 MG TABS tablet Take 1 tablet by mouth daily 30 tablet 8    VENTOLIN  (90 Base) MCG/ACT inhaler inhale 1 TO 2 puffs by mouth every 4 hours      Prenatal Vit-Fe Fumarate-FA (PRENATAL VITAMINS) 28-0.8 MG TABS Take by mouth      pyridoxine (RA VITAMIN B-6) 50 MG tablet Take 1 tablet by mouth daily (Patient not taking: Reported on 2/28/2023) 30 tablet 3     No current facility-administered medications for this visit. ALLERGIES:  Allergies as of 02/28/2023    (No Known Allergies)           Physical Exam Completed-See Epic Navigator    Chaperone for Intimate Exam   Chaperone was offered and accepted as part of the rooming process.  Chaperone: annabella                 Assessment:      Pregnancy at 15 and 4/7 weeks    Diagnosis Orders   1. Maternal exposure to alcohol in first trimester        2. Multigravida of advanced maternal age in first trimester        3. History of loop electrosurgical excision procedure (LEEP) of cervix affecting pregnancy, antepartum        4. 12 weeks gestation of pregnancy  PAP SMEAR    C.trachomatis N.gonorrhoeae DNA    Culture, Genital      5. MTHFR mutation        6.  MTHFR gene mutation  folic acid-pyridoxine-cyanocobalamine (FOLTX) 2.2-25-1 MG TABS tablet      7. Asthma, unspecified asthma severity, unspecified whether complicated, unspecified whether persistent                Patient Active Problem List    Diagnosis Date Noted    MTHFR gene mutation 2023     Priority: High    Asthma 2023     Priority: High    History of LEEP  2023     Priority: High     . TV CL every 2 weeks between 16 and 24 gestational weeks.  Fetal exposure to alcohol in first trimester 2023     Priority: High     Fetal echo at 24 weeks      [facility]  2023     Priority: High     Advanced Maternal Age Counseling    If a woman is over the age of 28, she is considered to be of Advanced Maternal Age, and with this title comes risks for mom and baby.  Increased risk of Down Syndrome - the most common chromosomal birth defect (chromosomes - cells that carry genes and transmit heredity information)    Increased risk of miscarriage    20% increase at ages 28 to 44    35% increase at ages 42-38    Over 50% increase by age 39   Increased risk of  Section () for delivery method    Gestational diabetes - diabetes that develops for the first time during pregnancy. Women who have this could possibly have a very large baby who then is at risk for injuries during delivery.  Pregnancy Induced Hypertension - High blood pressure    Placental Problems - one of the most common placental problems is placenta previa in which the placenta covers all or part of the uterine opening (cervix). This can cause severe bleeding during delivery and can make  delivery necessary. Even with all of these increased risks, with the advancement in medical procedures and testing, there are several ways to reduce your risk.  They include early and regular prenatal care, taking the multivitamin with folic acid prescribed by your health care manager, beginning pregnancy at a healthy weight, not smoking or drinking alcoholic beverages, and eating healthy foods. There are tests that all pregnant women are encouraged to take, but there is additional prenatal testing that is regularly offered to any woman 28 or older because of the potential of increased risks to the mother and baby. These tests are chorionic villus sampling (CVS) and amniocentesis. NIPT is also available which is a non-invasive option for fetal karyotyping. With CVS, a small sample of cells (called chorionic villi) is taken from the placenta where it attached to the wall of the uterus. Chorionic villi are tiny parts of the placenta; therefore they have the same genes as the baby. This test is 98% accurate, but can carry a slightly higher risk of miscarriage than amniocentesis, since the procedure is done in early pregnancy. Amniocentesis is a procedure where a sample of fluid is removed from the amniotic sac for analysis and evaluation. During this procedure, fluid is removed by placing a long needle through the abdominal wall into the amniotic sac. The amniocentesis needle is typically guided into the sac with the help of ultrasound imaging. Once the needle is in the sac, a syringe is used to withdraw the clear ebenezer-colored amniotic fluid, which looks a bit like urine. NIPT, utilizes the maternal blood to test for fetal cells. These fetal cells are then karyotyped for genetic evaluation. All of these tests, CVS, NIPT and amniocentesis, let couples know if the fetus will have genetic abnormalities, and will help them make informed decisions regarding their pregnancy. Karyotyping by either CVS, NIPT or Amniocentesis is the ONLY way to confirm the genetic chromosomal structure regarding trisomy; Down's Syndrome, Edward's or Pateau's. [facility] was reviewed. If NIPT is positive then a confirmatory amniocentesis would be recommended to confirm the diagnosis. [facility] guidelines were reviewed.         Hx RUE DVT and pulmonary embolism 2019 04/28/2019 Priority: Medium     2019 in [place]. Xarelto x 6mos.  Lipoma of left upper extremity                     Plan:      Initial labs drawn. Prenatal vitamins. RTO in 4 weeks. [facility] 3/20/2023. Problem list reviewed and updated. NT Screen/Quad Testing and MSAFP Single Marker: requested  Role of ultrasound in pregnancy discussed; fetal survey: requests  Amniocentesis discussed: Declined  NIPT Testing indicated AMA. Cultures & Wet Prep Collected  Follow-up in 4 weeks  Repeat Annual every 1 year  Cervical Cytology Evaluation begins at 24years old. If Negative Cytology, Follow-up screening per current guidelines. [facility] appointment scheduled      COUNSELING COMPLETED:  INITIAL OBSTETRICAL VISIT EVALUATION:  The patient was seen full history and physical was completed/reviewed. Cytology was collected for patients over 24years of age. Cultures were collected. The patient was counseled on office policies and she was counseled on termination of pregnancy in the [address] . The patient was counseled on Toxoplasmosis, HIV, Tobacco Abuse, Group Beta Strep Infections, Cystic Fibrosis,  Labor precautions and Sickle Cell disease. The patient was counseled on the risks of tobacco abuse. Both maternal and fetal. She was instructed to stop smoking if currently using tobacco. Morbidity, mortality, and cessation programs were reviewed. The risks include but are not limited to increased risks of  labor,  delivery, premature rupture of membranes, intrauterine growth restriction, intrauterine fetal demise and abruptio placenta. Secondary smoke risks were also reviewed. Increases in cancer, respiratory problems, and sudden infant death syndrome were reviewed as well. The patient was informed of a 2-4% risk of congenital anomalies in the general population. She was also informed that karyotyping is the only way to evaluate the fetus for genetic problems and genetic lethal anomalies.  Chorionic villous sampling, amniocentesis and NIPT testing were also discussed with morbidity rates in detail. She declined the procedure. Route of delivery and counseling on vaginal, operative vaginal, and  sections were completed with the risks of each to both the patient as well as her baby. The possibility of a blood transfusion was discussed as well. The patient was not opposed to receiving a transfusion if needed. Nuchal translucency/Quad Evaluation and MSAFP single marker testing was reviewed in detail with attention to timing of testing and their windows. For patients beyond the gestational age for Nuchal translucency evaluation Quad testing was recommended. Timing for the Quad test was reviewed. Benefits of the above testing was reviewed. A second trimester amniocentesis was also made available to the patient. Risks, Benefits and non-invasive alternative testing was reviewed. The literature regarding a questionable link to pitocin augmentation and induction of labor, the assistance of labor contractions and the initiation of contractions to help delivery, have been reviewed with the patient regarding the increased potential of having a  with Attention Deficit Hyperactivity Disorder and or Autism. These two disorders and the ramifications of their impact on a child and the family caring for that child has been reviewed with the patient in detail. She was given the risks, benefits and alternatives of the use of this medication. She has agreed to its use in the delivery of her unborn child if needed at the time of delivery, Yes. The patient was questioned in detail regarding any genetic misnomer history, chromosomal abnormalities, or learning disabilities in  herself, the father of the baby or their families. SHE DENIED ANY HISTORY AS STATED ABOVE: Yes    Upon completion of the visit all questions were answered and the patients follow-up and testing schedule were reviewed.   Prenatal vitamins were given.    T-dap Vaccine recommendations reviewed with the patient. Patient notified of timing of vaccination 27-36 weeks gestation. Patient aware Vaccine is NOT Live. Yes. The patient, Víctor Watt is a 27 y.o. female, was seen with a total time spent of 30 minutes for the visit on this date of service by the E/M provider. The time component had both face to face and non face to face time spent in determining the total time component. Counseling and education regarding her diagnosis listed below and her options regarding those diagnoses were also included in determining her time component. Diagnosis Orders   1. Maternal exposure to alcohol in first trimester        2. Multigravida of advanced maternal age in first trimester        3. History of loop electrosurgical excision procedure (LEEP) of cervix affecting pregnancy, antepartum        4. 12 weeks gestation of pregnancy  PAP SMEAR    C.trachomatis N.gonorrhoeae DNA    Culture, Genital      5. MTHFR mutation        6. MTHFR gene mutation  folic acid-pyridoxine-cyanocobalamine (FOLTX) 2.2-25-1 MG TABS tablet      7. Asthma, unspecified asthma severity, unspecified whether complicated, unspecified whether persistent             The patient had her preventative health maintenance recommendations and follow-up reviewed with her at the completion of her visit.

## 2023-03-01 LAB
C TRACH DNA SPEC QL PROBE+SIG AMP: NEGATIVE
HPV SAMPLE: NORMAL
HPV, GENOTYPE 16: NOT DETECTED
HPV, GENOTYPE 18: NOT DETECTED
HPV, HIGH RISK OTHER: NOT DETECTED
HPV, INTERPRETATION: NORMAL
N GONORRHOEA DNA SPEC QL PROBE+SIG AMP: NEGATIVE
SPECIMEN DESCRIPTION: NORMAL
SPECIMEN DESCRIPTION: NORMAL

## 2023-03-03 LAB
MICROORGANISM SPEC CULT: NORMAL
SPECIMEN DESCRIPTION: NORMAL

## 2023-03-14 ENCOUNTER — OFFICE VISIT (OUTPATIENT)
Dept: ONCOLOGY | Age: 33
End: 2023-03-14
Payer: COMMERCIAL

## 2023-03-14 VITALS
BODY MASS INDEX: 23.03 KG/M2 | WEIGHT: 142.7 LBS | TEMPERATURE: 96.9 F | SYSTOLIC BLOOD PRESSURE: 113 MMHG | DIASTOLIC BLOOD PRESSURE: 79 MMHG | HEART RATE: 76 BPM

## 2023-03-14 DIAGNOSIS — Z15.89 MTHFR GENE MUTATION: Primary | ICD-10-CM

## 2023-03-14 PROCEDURE — G8420 CALC BMI NORM PARAMETERS: HCPCS | Performed by: INTERNAL MEDICINE

## 2023-03-14 PROCEDURE — 99211 OFF/OP EST MAY X REQ PHY/QHP: CPT | Performed by: INTERNAL MEDICINE

## 2023-03-14 PROCEDURE — 1036F TOBACCO NON-USER: CPT | Performed by: INTERNAL MEDICINE

## 2023-03-14 PROCEDURE — G8484 FLU IMMUNIZE NO ADMIN: HCPCS | Performed by: INTERNAL MEDICINE

## 2023-03-14 PROCEDURE — G8427 DOCREV CUR MEDS BY ELIG CLIN: HCPCS | Performed by: INTERNAL MEDICINE

## 2023-03-14 PROCEDURE — 99214 OFFICE O/P EST MOD 30 MIN: CPT | Performed by: INTERNAL MEDICINE

## 2023-03-14 NOTE — PROGRESS NOTES
Patient ID: Jabari Brothers, 1990, [id number], 27 y.o. Referred by : MEAGAN De Anda CNP  Reason for consultation:   History of right upper extremity DVT in 2019  Double heterozygous for MTHFR gene mutation and thrombophilia work-up negative for lupus anticoagulant, factor V Leiden mutation and prothrombin gene mutation  HISTORY OF PRESENT ILLNESS:    Hematologic history:    Jabari Brothers is a 27 y.o. female was seen during initial consultation visit for history of right upper extremity DVT, appears unprovoked. Patient had her first episode of DVT in March 2019 when she was in [place]. She reports that she was very active at that time doing some exercise classes and Pilates and presented with right upper extremity swelling and her ultrasound Doppler on 3/31/2019 showed right upper DVT in the subclavian vein. She also had a CT of the chest on that day which showed several bilateral segmental and subsegmental pulmonary emboli. She was treated with 6 months of anticoagulation with Xarelto. She was following with vascular surgery there in [place] [address] and subsequently her anticoagulation was discontinued. She never had thrombophilia work-up at that time and her DVT was thought secondary to her vigorous exercise. She denies any personal history of miscarriages in the first trimester. Currently she is pregnant with first trimester and expected date of delivery on September 5. She was referred to hematology for her history of DVT in the past.  She report history of grandfather having CVA in 46s. Also history of multiple miscarriages in her paternal cousin. No history of lupus or any autoimmune conditions in the family. She is very active here and now moved to [place] in 2020. She denies any history of smoking or alcohol abuse. Interval history:  Patient is returning for follow-up visit and to discuss lab results and further recommendations.   She is following with her OB/GYN and progressing well into her pregnancy. Now she is in her second trimester. Her lab work showed double heterozygous MTHFR gene mutation. She does not have prior history of recurrent miscarriages but had 1 episode of unprovoked DVT. During this visit patient's allergy, social, medical, surgical history and medications were reviewed and updated. Past Medical History:   Diagnosis Date    Asthma     History of abnormal cervical Pap smear     Hx of blood clots 2019    rt shoulder       Past Surgical History:   Procedure Laterality Date    COLPOSCOPY      SHOULDER SURGERY Left 12/22/2020    SHOULDER LESION BIOPSY EXCISION performed by Steffanie Jimenez MD at [address]       No Known Allergies    Current Outpatient Medications   Medication Sig Dispense Refill    aspirin 81 MG chewable tablet Take 81 mg by mouth daily      folic acid-pyridoxine-cyanocobalamine (FOLTX) 2.2-25-1 MG TABS tablet Take 1 tablet by mouth daily 30 tablet 8    VENTOLIN  (90 Base) MCG/ACT inhaler inhale 1 TO 2 puffs by mouth every 4 hours      Prenatal Vit-Fe Fumarate-FA (PRENATAL VITAMINS) 28-0.8 MG TABS Take by mouth      pyridoxine (RA VITAMIN B-6) 50 MG tablet Take 1 tablet by mouth daily (Patient not taking: No sig reported) 30 tablet 3     No current facility-administered medications for this visit.        Social History     Socioeconomic History    Marital status: Single     Spouse name: Not on file    Number of children: Not on file    Years of education: Not on file    Highest education level: Not on file   Occupational History    Not on file   Tobacco Use    Smoking status: Never    Smokeless tobacco: Never   Vaping Use    Vaping Use: Never used   Substance and Sexual Activity    Alcohol use: Not Currently     Comment: 3x/week    Drug use: Never    Sexual activity: Yes   Other Topics Concern    Not on file   Social History Narrative    Not on file     Social Determinants of Health     Financial Resource Strain: Not on file   Food Insecurity: Not on file   Transportation Needs: Not on file   Physical Activity: Not on file   Stress: Not on file   Social Connections: Not on file   Intimate Partner Violence: Not on file   Housing Stability: Not on file       Family History   Problem Relation Age of Onset    Thyroid Disease Paternal Grandmother     Stroke Maternal Grandmother     No Known Problems Father     No Known Problems Mother     Thyroid Disease Paternal Aunt     COPD Maternal Aunt       REVIEW OF SYSTEM:     Constitutional: No fever or chills. No night sweats, no weight loss   Eyes: No eye discharge, double vision, or eye pain   HEENT: negative for sore mouth, sore throat, hoarseness and voice change   Respiratory: negative for cough , sputum, dyspnea, wheezing, hemoptysis, chest pain   Cardiovascular: negative for chest pain, dyspnea, palpitations, orthopnea, PND   Gastrointestinal: negative for nausea, vomiting, diarrhea, constipation, abdominal pain, Dysphagia, hematemesis and hematochezia   Genitourinary: negative for frequency, dysuria, nocturia, urinary incontinence, and hematuria   Integument: negative for rash, skin lesions, bruises.    Hematologic/Lymphatic: negative for easy bruising, bleeding, lymphadenopathy, petechiae and swelling/edema   Endocrine: negative for heat or cold intolerance, tremor, weight changes, change in bowel habits and hair loss   Musculoskeletal: negative for myalgias, arthralgias, pain, joint swelling,and bone pain   Neurological: negative for headaches, dizziness, seizures, weakness, numbness       OBJECTIVE:         Vitals:    03/14/23 0906   BP: 113/79   Pulse: 76   Temp: 96.9 °F (36.1 °C)       PHYSICAL EXAM:   General appearance - well appearing, no in pain or distress   Mental status - alert and cooperative   Eyes - pupils equal and reactive, extraocular eye movements intact   Ears - bilateral TM's and external ear canals normal   Mouth - mucous membranes moist, pharynx normal without lesions   Neck - supple, no significant adenopathy   Lymphatics - no palpable lymphadenopathy, no hepatosplenomegaly   Chest - clear to auscultation, no wheezes, rales or rhonchi, symmetric air entry   Heart - normal rate, regular rhythm, normal S1, S2, no murmurs, rubs, clicks or gallops   Abdomen - soft, nontender, nondistended, no masses or organomegaly   Neurological - alert, oriented, normal speech, no focal findings or movement disorder noted   Musculoskeletal - no joint tenderness, deformity or swelling   Extremities - peripheral pulses normal, no pedal edema, no clubbing or cyanosis   Skin - normal coloration and turgor, no rashes, no suspicious skin lesions noted ,      LABORATORY DATA:     Lab Results   Component Value Date    WBC 8.4 02/15/2023    HGB 13.2 02/15/2023    HCT 38.5 02/15/2023    MCV 89.5 02/15/2023     02/15/2023    LYMPHOPCT 19 (L) 02/15/2023    RBC 4.30 02/15/2023    MCH 30.6 02/15/2023    MCHC 34.2 02/15/2023    RDW 13.0 02/15/2023    MONOPCT 8 (H) 02/15/2023    BASOPCT 0 02/15/2023    NEUTROABS 6.10 02/15/2023    LYMPHSABS 1.60 02/15/2023    MONOSABS 0.60 02/15/2023    EOSABS 0.10 02/15/2023    BASOSABS 0.00 02/15/2023         Chemistry        Component Value Date/Time     09/13/2020 1409    K 4.0 09/13/2020 1409     09/13/2020 1409    CO2 26 09/13/2020 1409    BUN 14 09/13/2020 1409    CREATININE 0.70 09/13/2020 1409        Component Value Date/Time    CALCIUM 9.0 09/13/2020 1409        PATHOLOGY DATA:   Reviewed   MTHFR Mutation  Order: [id number]  Status: Final result    Visible to patient: Yes (seen)    Next appt: 03/20/2023 at 10:00 AM in Perinatology (Consults)    Dx: History of DVT (deep vein thrombosis)    0 Result Notes      Component 2/14/23 0935   Specimen Whole Blood    MTHFR Mutation C665T Heterozygous    MTHFR Mutation [id number] Heterozygous    MTHFR Interp See Note    Comment: (NOTE)     Indication for testing: Determine genetic contribution to   hyperhomocysteinemia.      Compound Heterozygous MTHFR c.665C>T/c.1286A>C: One copy of each   of the two MTHFR gene variants tested, c.665C>T (previously   designated C677T) and c.1286A>C (previously designated U4804M)   were detected. IMAGING DATA:      US OB LESS THAN 14 WEEKS SINGLE OR [address] [facility]  [address]; [address]  [address]  [phone] [place] [phone] Fax    FIRST TRIMESTER ULTRASOUND REPORT    EXAMINATION:  PELVIC ULTRASOUND-First Trimester (<14 weeks)    2023    MRN: [id number]  Contact Serial #: [id number]    Cleatus Prom  YOB: 1990  Age: 27 y.o. Performed By: Des Fletcher RDMS  Attending: Joao Silva DO  Referred By: Joao Silva, [address] [place] [address] 75, [address]    Services Provided:  First Trimester Ultrasound (<14 weeks)    Specific Ultrasound Imaging Technique Utilized:  Transabdominal Approach: Yes  Transvaginal Approach: No    Comparison:  none    HISTORY & INDICATION:    History:  Cleatus Prom is a 27 y.o. female     EGA: 10w1d    OB History     T0    L0    SAB0  IAB0  Ectopic0  Molar0  Multiple0  Live Births0     Name of Baby 1: Not recorded    Date: Not recorded     GA: Not recorded     Delivery: Not recorded    Apgar1: Not recorded     Toni Yoonon: Not recorded    Living: Not recorded    Name of Baby 2: Not recorded    Date: Not recorded     GA: Not recorded     Delivery: Not recorded    Apgar1: Not recorded     Apgar5: Not recorded    Living: Not recorded    Indication/Reason for imaging exam:  1st trimester dating/viability  Early stage of pregnancy    Summary:  The images were reviewed. The Ultrasound report is scanned and attached for specific findings and measurements. Patient's last menstrual period was 2022. Gest Age     EDC  LMP:           9w5d     2023  (BEST)    Ultrasound Today:  10w1d +/-6d   2023     IMPRESSION:  1.  There is  a Single viable intrauterine pregnancy. A yolk sac is  identified. 2. Pregnancy dating (NIA) was completed today and confirmed by ultrasound. 3. CRL is 3.18 cm and FHR is confirmed at 169 bpm    4. The patient was counseled on the incidents of birth defects in the general population is 2-4% and that ultrasound does not diagnose every form of congenital abnormality and has limitations. The patient was made fully aware and understands that the only way to evaluate the chromosomal makeup to near 897% certainty is with invasive testing such as chorionic villous sampling or amniocentesis. These options were reviewed in detail with all risks/benefits and alternatives. NIPT testing was also reviewed with the patient, taking a maternal blood sample and screening it for fetal cells then performing a genetic karyotype. She was made aware that if this were to be positive for a trisomy then a confirmatory amniocentesis or CVS for confirmation would be needed. The patient Requested an [facility] referral to further discuss testing. [facility] guidelines were reviewed with the patient. 5. There were not any adnexal masses     6. AMA counseling-see below    RECOMMENDATIONS:  Follow up with Maternal Fetal Medicine for nuchal translucency testing and or NIPT testing per patients request and [facility] recommendations. A Follow-up ultrasound is recommended with Maternal Fetal Medicine at 18-20 weeks for a detailed fetal anatomical survey and transvaginal imaging for cervical length. Advanced Maternal Age Counseling    If a woman is over the age of 28, she is considered to be of Advanced Maternal Age, and with this title comes risks for mom and baby.    Increased risk of Down Syndrome - the most common chromosomal birth defect (chromosomes - cells that carry genes and transmit heredity information)   Increased risk of miscarriage   20% increase at ages 28 to 44   35% increase at ages 42-38   Over 50% increase by age 39  Increased risk of  Section () for delivery method   Gestational diabetes - diabetes that develops for the first time during pregnancy. Women who have this could possibly have a very large baby who then is at risk for injuries during delivery. Pregnancy Induced Hypertension - High blood pressure   Placental Problems - one of the most common placental problems is placenta previa in which the placenta covers all or part of the uterine opening (cervix). This can cause severe bleeding during delivery and can make  delivery necessary. Even with all of these increased risks, with the advancement in medical procedures and testing, there are several ways to reduce your risk. They include early and regular prenatal care, taking the multivitamin with folic acid prescribed by your health care manager, beginning pregnancy at a healthy weight, not smoking or drinking alcoholic beverages, and eating healthy foods. There are tests that all pregnant women are encouraged to take, but there is additional prenatal testing that is regularly offered to any woman 28 or older because of the potential of increased risks to the mother and baby. These tests are chorionic villus sampling (CVS) and amniocentesis. NIPT is also available which is a non-invasive option for fetal karyotyping. With CVS, a small sample of cells (called chorionic villi) is taken from the placenta where it attached to the wall of the uterus. Chorionic villi are tiny parts of the placenta; therefore they have the same genes as the baby. This test is 98% accurate, but can carry a slightly higher risk of miscarriage than amniocentesis, since the procedure is done in early pregnancy. Amniocentesis is a procedure where a sample of fluid is removed from the amniotic sac for analysis and evaluation. During this procedure, fluid is removed by placing a long needle through the abdominal wall into the amniotic sac.  The amniocentesis needle is typically guided into the sac with the help of ultrasound imaging. Once the needle is in the sac, a syringe is used to withdraw the clear ebenezer-colored amniotic fluid, which looks a bit like urine. NIPT, utilizes the maternal blood to test for fetal cells. These fetal cells are then karyotyped for genetic evaluation. All of these tests, CVS, NIPT and amniocentesis, let couples know if the fetus will have genetic abnormalities, and will help them make informed decisions regarding their pregnancy. Karyotyping by either CVS, NIPT or Amniocentesis is the ONLY way to confirm the genetic chromosomal structure regarding trisomy; Down's Syndrome, Edward's or Pateau's. [facility] was reviewed. If NIPT is positive then a confirmatory amniocentesis would be recommended to confirm the diagnosis. [facility] guidelines were reviewed. Continue with Obstetrical visits as scheduled    The above findings and recommendations were reviewed with the patient today. She is amenable to the plan of care. Electronically signed by Blue García DO on 2/8/23 at 10:51 PM EST  CT chest 3/31/2019  FINDINGS:     VESSELS: There is good opacification of the pulmonary arteries. Evaluation is limited by respiratory motion artifact. Small filling defects seen at the subsegmental levels of arterials   supplying the right lower lobe. Series 6 image 175, 181, 182, 193. Filling defects seen at the subsegmental levels of arterials   supplying the left lower lobe. Series 6 image 163, 180, 151   There is no evidence of filling defect to suggest pulmonary embolism   within the main pulmonary trunk and right and left pulmonary arteries. LUNGS/AIRWAYS: No pulmonary nodules, masses, or areas of   consolidation. No endobronchial lesion. PLEURA: No pleural effusion. HEART: Normal heart size, there is no evidence of intraventricular   septum straightening or right heart strain. No pericardial effusion. MEDIASTINUM: No lymphadenopathy.    CHEST WALL: There are scattered bilateral axillary lymph nodes not   meeting size criteria for enlargement   BONES: Normal.     UPPER ABDOMEN: Normal.       IMPRESSION   IMPRESSION:     Scattered bilateral pulmonary emboli in the subsegmental branches   supplying the bilateral lower lobes. There is no evidence of heart   strain. FINDINGS:     RIGHT UPPER EXTREMITY:     INTERNAL JUGULAR VEIN: Normal color flow, compression, and   transmitted cardiac pulsation. SUBCLAVIAN VEIN: Hypoechoic intraluminal thrombus is seen at the   central right subclavian vein with associated dilatation of the vein   and reduced color Doppler flow, consistent with acute thrombus. AXILLARY VEIN: Normal color flow, compression, and transmitted   cardiac pulsation. BRACHIAL VEIN: Normal color flow and compression. IMPRESSION   IMPRESSION: Acute deep venous thrombosis of the central right   subclavian vein       ASSESSMENT:    Artur Villalobos is a 27 y.o. female with a history of DVT in March 2019 in the right upper extremity and currently pregnant in her first trimester. I reviewed the laboratory data, imaging studies, outside records, discussed diagnosis, prognosis and treatment recommendations. It appears that her first right approximate DVT is possibly most likely unprovoked although her vigorous exercise at that time may have affected some circulation and cannot be ruled out. I reviewed the results of recent lab work which showed double heterozygous MTHFR gene mutation. Her lupus antibody test, factor V Leiden and prothrombin mutation is negative. I explained that as per the ACCP guidelines MTHFR gene mutation is not an indication for lifelong anticoagulation. Her risk of VTE is higher than general population and there has been some evidence length with miscarriages with MTHFR gene mutation although because her relationship could not be established definitively.   I discussed the date of prophylactic anticoagulation during pregnancy especially during first trimester in the event of recurrent miscarriages. However she does not have recurrent miscarriages history and this is her first pregnancy so therefore I would recommend low-dose aspirin. I advised her to follow-up with her GYN for regular checkups. Patient's questions were sought and answered to her satisfaction and she agreed to proceed with the plan. PLAN:   Advised patient to take low-dose aspirin 81 mg   She is into her second trimester already   I discussed the role of prophylactic anticoagulation however she does not have multiple miscarriages in the past and this is her first pregnancy and so far progressing well   Return to clinic in 4 to 5 months or earlier if needed       Kevin Abdi MD  Hematologist/Medical Oncologist    On this date 3/14/23  I have spent 40 minutes reviewing previous notes, test results and face to face with the patient discussing the diagnosis and importance of compliance with the treatment plan. Greater than 50% of that time was spent face-to-face with the patient in counseling and coordinating her care. This note is created with the assistance of a speech recognition program.  While intending to generate a document that actually reflects the content of the visit, the document can still have some errors including those of syntax and sound a like substitutions which may escape proof reading. It such instances, actual meaning can be extrapolated by contextual diversion.

## 2023-03-15 ENCOUNTER — TELEPHONE (OUTPATIENT)
Dept: ONCOLOGY | Age: 33
End: 2023-03-15

## 2023-03-15 NOTE — TELEPHONE ENCOUNTER
AVS from 3/14/23    RTC in 4-5 months     Rv scheduled for 7/11 @ 8:45 am     Pt was given AVS and appointment schedule    Electronically signed by Rex Cintron on 3/15/2023 at 8:42 AM

## 2023-03-20 ENCOUNTER — HOSPITAL ENCOUNTER (OUTPATIENT)
Age: 33
Discharge: HOME | End: 2023-03-20
Payer: COMMERCIAL

## 2023-03-20 ENCOUNTER — ROUTINE PRENATAL (OUTPATIENT)
Dept: PERINATAL CARE | Age: 33
End: 2023-03-20
Payer: COMMERCIAL

## 2023-03-20 VITALS
HEART RATE: 76 BPM | DIASTOLIC BLOOD PRESSURE: 74 MMHG | TEMPERATURE: 98 F | SYSTOLIC BLOOD PRESSURE: 120 MMHG | HEIGHT: 66 IN | WEIGHT: 142 LBS | RESPIRATION RATE: 16 BRPM | BODY MASS INDEX: 22.82 KG/M2

## 2023-03-20 DIAGNOSIS — O09.522 MULTIGRAVIDA OF ADVANCED MATERNAL AGE IN SECOND TRIMESTER: Primary | ICD-10-CM

## 2023-03-20 DIAGNOSIS — O99.512 ASTHMA AFFECTING PREGNANCY IN SECOND TRIMESTER: ICD-10-CM

## 2023-03-20 DIAGNOSIS — Z86.718 HISTORY OF VENOUS THROMBOEMBOLISM: ICD-10-CM

## 2023-03-20 DIAGNOSIS — J45.909 ASTHMA AFFECTING PREGNANCY IN SECOND TRIMESTER: ICD-10-CM

## 2023-03-20 DIAGNOSIS — Z98.890 HISTORY OF CERVICAL LEEP BIOPSY AFFECTING CARE OF MOTHER, ANTEPARTUM: ICD-10-CM

## 2023-03-20 DIAGNOSIS — Z3A.15 15 WEEKS GESTATION OF PREGNANCY: ICD-10-CM

## 2023-03-20 DIAGNOSIS — O99.312 ALCOHOL USE AFFECTING PREGNANCY IN SECOND TRIMESTER: ICD-10-CM

## 2023-03-20 DIAGNOSIS — O34.40 HISTORY OF CERVICAL LEEP BIOPSY AFFECTING CARE OF MOTHER, ANTEPARTUM: ICD-10-CM

## 2023-03-20 PROCEDURE — 99245 OFF/OP CONSLTJ NEW/EST HI 55: CPT | Performed by: OBSTETRICS & GYNECOLOGY

## 2023-03-20 PROCEDURE — G8420 CALC BMI NORM PARAMETERS: HCPCS | Performed by: OBSTETRICS & GYNECOLOGY

## 2023-03-20 PROCEDURE — 36415 COLL VENOUS BLD VENIPUNCTURE: CPT

## 2023-03-20 PROCEDURE — G8484 FLU IMMUNIZE NO ADMIN: HCPCS | Performed by: OBSTETRICS & GYNECOLOGY

## 2023-03-20 PROCEDURE — 82105 ALPHA-FETOPROTEIN SERUM: CPT

## 2023-03-20 PROCEDURE — G8427 DOCREV CUR MEDS BY ELIG CLIN: HCPCS | Performed by: OBSTETRICS & GYNECOLOGY

## 2023-03-21 ENCOUNTER — ROUTINE PRENATAL (OUTPATIENT)
Dept: PERINATAL CARE | Age: 33
End: 2023-03-21
Payer: COMMERCIAL

## 2023-03-21 VITALS
DIASTOLIC BLOOD PRESSURE: 75 MMHG | BODY MASS INDEX: 22.82 KG/M2 | HEART RATE: 76 BPM | SYSTOLIC BLOOD PRESSURE: 118 MMHG | TEMPERATURE: 97.6 F | HEIGHT: 66 IN | RESPIRATION RATE: 16 BRPM | WEIGHT: 142 LBS

## 2023-03-21 DIAGNOSIS — Z3A.15 15 WEEKS GESTATION OF PREGNANCY: ICD-10-CM

## 2023-03-21 DIAGNOSIS — O09.522 MULTIGRAVIDA OF ADVANCED MATERNAL AGE IN SECOND TRIMESTER: ICD-10-CM

## 2023-03-21 DIAGNOSIS — J45.909 ASTHMA AFFECTING PREGNANCY IN SECOND TRIMESTER: ICD-10-CM

## 2023-03-21 DIAGNOSIS — Z13.89 ENCOUNTER FOR ROUTINE SCREENING FOR MALFORMATION USING ULTRASONICS: ICD-10-CM

## 2023-03-21 DIAGNOSIS — O99.512 ASTHMA AFFECTING PREGNANCY IN SECOND TRIMESTER: ICD-10-CM

## 2023-03-21 DIAGNOSIS — O34.40 HISTORY OF CERVICAL LEEP BIOPSY AFFECTING CARE OF MOTHER, ANTEPARTUM: Primary | ICD-10-CM

## 2023-03-21 DIAGNOSIS — Z98.890 HISTORY OF CERVICAL LEEP BIOPSY AFFECTING CARE OF MOTHER, ANTEPARTUM: Primary | ICD-10-CM

## 2023-03-21 LAB
ABDOMINAL CIRCUMFERENCE: NORMAL
ABDOMINAL CIRCUMFERENCE: NORMAL
AFP INTERPRETATION: NORMAL
AFP MOM: 1.04
AFP SPECIMEN: NORMAL
AFP: 34 NG/ML
BIPARIETAL DIAMETER: NORMAL
BIPARIETAL DIAMETER: NORMAL
DATE OF BIRTH: NORMAL
DATING METHOD: NORMAL
DETERMINED BY: NORMAL
DIABETIC: NEGATIVE
DONOR EGG?: NORMAL
DUE DATE: NORMAL
ESTIMATED DUE DATE: NORMAL
ESTIMATED FETAL WEIGHT: NORMAL
ESTIMATED FETAL WEIGHT: NORMAL
FAMILY HISTORY NTD: NEGATIVE
FEMORAL DIAMETER: NORMAL
FEMORAL DIAMETER: NORMAL
GESTATIONAL AGE: NORMAL
HC/AC: NORMAL
HC/AC: NORMAL
HEAD CIRCUMFERENCE: NORMAL
HEAD CIRCUMFERENCE: NORMAL
IN VITRO FERTILIZATION: NORMAL
INSULIN REQ DIABETES: NO
LAST MENSTRUAL PERIOD: NORMAL
MATERNAL AGE AT EDD: 35.2 YR
MATERNAL WEIGHT: 142
MONOCHORIONIC TWINS: NORMAL
NUMBER OF FETUSES: NORMAL
PATIENT WEIGHT UNITS: NORMAL
PATIENT WEIGHT: NORMAL
RACE (MATERNAL): NORMAL
RACE: NORMAL
REPEAT SPECIMEN?: NORMAL
SEND OUT REPORT: NORMAL
SMOKING: NEGATIVE
SMOKING: NO
VALPROIC/CARBAMAZEP: NORMAL
ZZ NTE CLEAN UP: HISTORY: NO

## 2023-03-21 PROCEDURE — 76805 OB US >/= 14 WKS SNGL FETUS: CPT | Performed by: OBSTETRICS & GYNECOLOGY

## 2023-03-21 PROCEDURE — 76817 TRANSVAGINAL US OBSTETRIC: CPT | Performed by: OBSTETRICS & GYNECOLOGY

## 2023-03-21 PROCEDURE — 99999 PR OFFICE/OUTPT VISIT,PROCEDURE ONLY: CPT | Performed by: OBSTETRICS & GYNECOLOGY

## 2023-03-27 ENCOUNTER — ROUTINE PRENATAL (OUTPATIENT)
Dept: OBGYN CLINIC | Age: 33
End: 2023-03-27
Payer: COMMERCIAL

## 2023-03-27 VITALS
HEART RATE: 85 BPM | DIASTOLIC BLOOD PRESSURE: 76 MMHG | WEIGHT: 142 LBS | BODY MASS INDEX: 22.92 KG/M2 | SYSTOLIC BLOOD PRESSURE: 118 MMHG

## 2023-03-27 DIAGNOSIS — Z15.89 MTHFR GENE MUTATION: ICD-10-CM

## 2023-03-27 DIAGNOSIS — O34.40 HISTORY OF LOOP ELECTROSURGICAL EXCISION PROCEDURE (LEEP) OF CERVIX AFFECTING PREGNANCY, ANTEPARTUM: ICD-10-CM

## 2023-03-27 DIAGNOSIS — Z98.890 HISTORY OF LOOP ELECTROSURGICAL EXCISION PROCEDURE (LEEP) OF CERVIX AFFECTING PREGNANCY, ANTEPARTUM: ICD-10-CM

## 2023-03-27 DIAGNOSIS — I82.A11 ACUTE DEEP VEIN THROMBOSIS (DVT) OF AXILLARY VEIN OF RIGHT UPPER EXTREMITY (HCC): ICD-10-CM

## 2023-03-27 DIAGNOSIS — Z3A.16 16 WEEKS GESTATION OF PREGNANCY: ICD-10-CM

## 2023-03-27 DIAGNOSIS — J45.909 ASTHMA, UNSPECIFIED ASTHMA SEVERITY, UNSPECIFIED WHETHER COMPLICATED, UNSPECIFIED WHETHER PERSISTENT: ICD-10-CM

## 2023-03-27 DIAGNOSIS — O09.521 MULTIGRAVIDA OF ADVANCED MATERNAL AGE IN FIRST TRIMESTER: ICD-10-CM

## 2023-03-27 DIAGNOSIS — O09.891 MATERNAL EXPOSURE TO ALCOHOL IN FIRST TRIMESTER: ICD-10-CM

## 2023-03-27 DIAGNOSIS — O09.92 HRP (HIGH RISK PREGNANCY), SECOND TRIMESTER: Primary | ICD-10-CM

## 2023-03-27 PROCEDURE — G8427 DOCREV CUR MEDS BY ELIG CLIN: HCPCS | Performed by: NURSE PRACTITIONER

## 2023-03-27 PROCEDURE — G8484 FLU IMMUNIZE NO ADMIN: HCPCS | Performed by: NURSE PRACTITIONER

## 2023-03-27 PROCEDURE — G8420 CALC BMI NORM PARAMETERS: HCPCS | Performed by: NURSE PRACTITIONER

## 2023-03-27 PROCEDURE — 1036F TOBACCO NON-USER: CPT | Performed by: NURSE PRACTITIONER

## 2023-03-27 PROCEDURE — 99213 OFFICE O/P EST LOW 20 MIN: CPT | Performed by: NURSE PRACTITIONER

## 2023-03-27 NOTE — PROGRESS NOTES
exposure to alcohol in first trimester        4. History of loop electrosurgical excision procedure (LEEP) of cervix affecting pregnancy, antepartum        5. Multigravida of advanced maternal age in first trimester        6. Acute deep vein thrombosis (DVT) of axillary vein of right upper extremity (HCC)        7. Asthma, unspecified asthma severity, unspecified whether complicated, unspecified whether persistent        8. 16 weeks gestation of pregnancy         and her options. She was also counseled on her preventative health maintenance recommendations and follow-up.

## 2023-04-04 ENCOUNTER — ROUTINE PRENATAL (OUTPATIENT)
Dept: PERINATAL CARE | Age: 33
End: 2023-04-04
Payer: COMMERCIAL

## 2023-04-04 VITALS
BODY MASS INDEX: 23.63 KG/M2 | TEMPERATURE: 98 F | WEIGHT: 147 LBS | HEART RATE: 74 BPM | SYSTOLIC BLOOD PRESSURE: 108 MMHG | HEIGHT: 66 IN | DIASTOLIC BLOOD PRESSURE: 66 MMHG | RESPIRATION RATE: 16 BRPM

## 2023-04-04 DIAGNOSIS — O34.40 HISTORY OF CERVICAL LEEP BIOPSY AFFECTING CARE OF MOTHER, ANTEPARTUM: Primary | ICD-10-CM

## 2023-04-04 DIAGNOSIS — Z98.890 HISTORY OF CERVICAL LEEP BIOPSY AFFECTING CARE OF MOTHER, ANTEPARTUM: Primary | ICD-10-CM

## 2023-04-04 DIAGNOSIS — Z3A.17 17 WEEKS GESTATION OF PREGNANCY: ICD-10-CM

## 2023-04-04 LAB
ABDOMINAL CIRCUMFERENCE: NORMAL
BIPARIETAL DIAMETER: NORMAL
ESTIMATED FETAL WEIGHT: NORMAL
FEMORAL DIAMETER: NORMAL
HC/AC: NORMAL
HEAD CIRCUMFERENCE: NORMAL

## 2023-04-04 PROCEDURE — 99999 PR OFFICE/OUTPT VISIT,PROCEDURE ONLY: CPT | Performed by: OBSTETRICS & GYNECOLOGY

## 2023-04-04 PROCEDURE — 76817 TRANSVAGINAL US OBSTETRIC: CPT | Performed by: OBSTETRICS & GYNECOLOGY

## 2023-04-18 ENCOUNTER — ROUTINE PRENATAL (OUTPATIENT)
Dept: PERINATAL CARE | Age: 33
End: 2023-04-18
Payer: COMMERCIAL

## 2023-04-18 VITALS
TEMPERATURE: 98.4 F | RESPIRATION RATE: 16 BRPM | SYSTOLIC BLOOD PRESSURE: 110 MMHG | BODY MASS INDEX: 23.78 KG/M2 | WEIGHT: 148 LBS | HEART RATE: 76 BPM | DIASTOLIC BLOOD PRESSURE: 64 MMHG | HEIGHT: 66 IN

## 2023-04-18 DIAGNOSIS — O43.192 PLACENTA SUCCENTURIATA IN SECOND TRIMESTER: ICD-10-CM

## 2023-04-18 DIAGNOSIS — O09.522 MULTIGRAVIDA OF ADVANCED MATERNAL AGE IN SECOND TRIMESTER: Primary | ICD-10-CM

## 2023-04-18 DIAGNOSIS — O99.512 ASTHMA AFFECTING PREGNANCY IN SECOND TRIMESTER: ICD-10-CM

## 2023-04-18 DIAGNOSIS — O34.40 HISTORY OF CERVICAL LEEP BIOPSY AFFECTING CARE OF MOTHER, ANTEPARTUM: ICD-10-CM

## 2023-04-18 DIAGNOSIS — Z3A.19 19 WEEKS GESTATION OF PREGNANCY: ICD-10-CM

## 2023-04-18 DIAGNOSIS — J45.909 ASTHMA AFFECTING PREGNANCY IN SECOND TRIMESTER: ICD-10-CM

## 2023-04-18 DIAGNOSIS — Z98.890 HISTORY OF CERVICAL LEEP BIOPSY AFFECTING CARE OF MOTHER, ANTEPARTUM: ICD-10-CM

## 2023-04-18 PROBLEM — O43.109 ABNORMAL PLACENTA: Status: ACTIVE | Noted: 2023-04-18

## 2023-04-18 LAB
ABDOMINAL CIRCUMFERENCE: NORMAL
ABDOMINAL CIRCUMFERENCE: NORMAL
BIPARIETAL DIAMETER: NORMAL
BIPARIETAL DIAMETER: NORMAL
ESTIMATED FETAL WEIGHT: NORMAL
ESTIMATED FETAL WEIGHT: NORMAL
FEMORAL DIAMETER: NORMAL
FEMORAL DIAMETER: NORMAL
HC/AC: NORMAL
HC/AC: NORMAL
HEAD CIRCUMFERENCE: NORMAL
HEAD CIRCUMFERENCE: NORMAL

## 2023-04-18 PROCEDURE — 76811 OB US DETAILED SNGL FETUS: CPT | Performed by: OBSTETRICS & GYNECOLOGY

## 2023-04-18 PROCEDURE — 99999 PR OFFICE/OUTPT VISIT,PROCEDURE ONLY: CPT | Performed by: OBSTETRICS & GYNECOLOGY

## 2023-04-18 PROCEDURE — 76817 TRANSVAGINAL US OBSTETRIC: CPT | Performed by: OBSTETRICS & GYNECOLOGY

## 2023-04-18 NOTE — PROGRESS NOTES
Patient previously declined invasive prenatal diagnostic testing (including for evaluation and testing for fetal aneuploidy, fetal microdeletions, fetal single gene disorders, fetal microarray testing, etc). Please refer to non-invasive prenatal testing/NIPT results (with the Dendron Complete test from [facility]). Please refer to completed maternal carrier testing results (with the Dendron test from [address] [place] [address]). I would advise continuation of daily oral baby aspirin 81 mg based on guidelines by the USPSTF/ACOG (for preeclampsia prevention for pregnant women at \"high-risk\"  for preeclampsia). Patient declines a Maternal-Fetal Medicine complete physician consultation today regarding the fetal and/or maternal medical/obstetrical complications/co-morbidities of pregnancy (RE:  anterior placenta with a posterior succenturiate (accessory) lobe of the placenta). Maternal-Fetal Medicine (MFM) attending physician will defer all management for these medical/obstetrical complications of pregnancy to the primary attending obstetrical physician/provider, as a result. Therefore, only an ultrasound evaluation was completed today in the MFM office. Please refer to [address] resident progress note in EPIC.

## 2023-04-18 NOTE — PROGRESS NOTES
Obstetric/Gynecology Maternal Fetal Medicine Resident Note    Patient declines formal consult with MFM attending physician for accessory lobe of placenta.      Jerald Chawla MD  OBGYN Resident, PGY1  [facility]  4/18/2023, 9:10 AM

## 2023-04-24 ENCOUNTER — TELEPHONE (OUTPATIENT)
Dept: OBGYN CLINIC | Age: 33
End: 2023-04-24

## 2023-04-24 NOTE — TELEPHONE ENCOUNTER
Called patient to see how she is doing since last visit. Had an elevated EPDS. No medication started at that time.  Voicemail to call office back

## 2023-04-25 ENCOUNTER — ROUTINE PRENATAL (OUTPATIENT)
Dept: OBGYN CLINIC | Age: 33
End: 2023-04-25

## 2023-04-25 VITALS
HEART RATE: 86 BPM | WEIGHT: 150 LBS | BODY MASS INDEX: 24.21 KG/M2 | SYSTOLIC BLOOD PRESSURE: 114 MMHG | DIASTOLIC BLOOD PRESSURE: 68 MMHG

## 2023-04-25 DIAGNOSIS — O09.521 MULTIGRAVIDA OF ADVANCED MATERNAL AGE IN FIRST TRIMESTER: ICD-10-CM

## 2023-04-25 DIAGNOSIS — Z15.89 MTHFR GENE MUTATION: ICD-10-CM

## 2023-04-25 DIAGNOSIS — I82.A11 ACUTE DEEP VEIN THROMBOSIS (DVT) OF AXILLARY VEIN OF RIGHT UPPER EXTREMITY (HCC): ICD-10-CM

## 2023-04-25 DIAGNOSIS — O34.40 HISTORY OF LOOP ELECTROSURGICAL EXCISION PROCEDURE (LEEP) OF CERVIX AFFECTING PREGNANCY, ANTEPARTUM: ICD-10-CM

## 2023-04-25 DIAGNOSIS — O09.891 MATERNAL EXPOSURE TO ALCOHOL IN FIRST TRIMESTER: ICD-10-CM

## 2023-04-25 DIAGNOSIS — O09.92 HRP (HIGH RISK PREGNANCY), SECOND TRIMESTER: Primary | ICD-10-CM

## 2023-04-25 DIAGNOSIS — Z3A.20 20 WEEKS GESTATION OF PREGNANCY: ICD-10-CM

## 2023-04-25 DIAGNOSIS — Z98.890 HISTORY OF LOOP ELECTROSURGICAL EXCISION PROCEDURE (LEEP) OF CERVIX AFFECTING PREGNANCY, ANTEPARTUM: ICD-10-CM

## 2023-04-25 DIAGNOSIS — O43.109 PLACENTAL ABNORMALITY, ANTEPARTUM: ICD-10-CM

## 2023-04-25 DIAGNOSIS — J45.909 ASTHMA, UNSPECIFIED ASTHMA SEVERITY, UNSPECIFIED WHETHER COMPLICATED, UNSPECIFIED WHETHER PERSISTENT: ICD-10-CM

## 2023-04-25 PROCEDURE — G8420 CALC BMI NORM PARAMETERS: HCPCS | Performed by: OBSTETRICS & GYNECOLOGY

## 2023-04-25 PROCEDURE — 1036F TOBACCO NON-USER: CPT | Performed by: OBSTETRICS & GYNECOLOGY

## 2023-04-25 PROCEDURE — G8427 DOCREV CUR MEDS BY ELIG CLIN: HCPCS | Performed by: OBSTETRICS & GYNECOLOGY

## 2023-04-25 PROCEDURE — 0502F SUBSEQUENT PRENATAL CARE: CPT | Performed by: OBSTETRICS & GYNECOLOGY

## 2023-04-25 NOTE — PROGRESS NOTES
HRP (high risk pregnancy), second trimester        2. MTHFR gene mutation        3. Asthma, unspecified asthma severity, unspecified whether complicated, unspecified whether persistent        4. Maternal exposure to alcohol in first trimester        5. History of loop electrosurgical excision procedure (LEEP) of cervix affecting pregnancy, antepartum        6. Multigravida of advanced maternal age in first trimester        7. Acute deep vein thrombosis (DVT) of axillary vein of right upper extremity (HCC)        8. Placental abnormality, antepartum        9. 20 weeks gestation of pregnancy              Plan:  The patient will return to the office for her next visit in 4 weeks. See antepartum flow sheet. Pt had an unprovoked clot subclavian/ PE. Pt + MTHFR. Pt declined lovenox. Pt was counseled on DVT possibility. Pt still declined. Pt counseled on risks/ benefits/ alternative options  Pt with h/o anxiety/ ADHD. Pt states she is feeling much improved. Pt is seeing a counselor. Pt declined any medication at this time. Patient was seen with total face to face time of 30 minutes. More than 50% of this visit was on counseling and education regarding her    Diagnosis Orders   1. HRP (high risk pregnancy), second trimester        2. MTHFR gene mutation        3. Asthma, unspecified asthma severity, unspecified whether complicated, unspecified whether persistent        4. Maternal exposure to alcohol in first trimester        5. History of loop electrosurgical excision procedure (LEEP) of cervix affecting pregnancy, antepartum        6. Multigravida of advanced maternal age in first trimester        7. Acute deep vein thrombosis (DVT) of axillary vein of right upper extremity (HCC)        8. Placental abnormality, antepartum        9. 20 weeks gestation of pregnancy         and her options. She was also counseled on her preventative health maintenance recommendations and follow-up.

## 2023-05-02 ENCOUNTER — ROUTINE PRENATAL (OUTPATIENT)
Dept: PERINATAL CARE | Age: 33
End: 2023-05-02
Payer: COMMERCIAL

## 2023-05-02 VITALS
HEIGHT: 66 IN | DIASTOLIC BLOOD PRESSURE: 72 MMHG | HEART RATE: 76 BPM | WEIGHT: 150 LBS | SYSTOLIC BLOOD PRESSURE: 110 MMHG | RESPIRATION RATE: 16 BRPM | BODY MASS INDEX: 24.11 KG/M2 | TEMPERATURE: 97 F

## 2023-05-02 DIAGNOSIS — Z98.890 HISTORY OF CERVICAL LEEP BIOPSY AFFECTING CARE OF MOTHER, ANTEPARTUM: Primary | ICD-10-CM

## 2023-05-02 DIAGNOSIS — J45.909 ASTHMA AFFECTING PREGNANCY IN SECOND TRIMESTER: ICD-10-CM

## 2023-05-02 DIAGNOSIS — O99.512 ASTHMA AFFECTING PREGNANCY IN SECOND TRIMESTER: ICD-10-CM

## 2023-05-02 DIAGNOSIS — O09.522 MULTIGRAVIDA OF ADVANCED MATERNAL AGE IN SECOND TRIMESTER: ICD-10-CM

## 2023-05-02 DIAGNOSIS — Z3A.21 21 WEEKS GESTATION OF PREGNANCY: ICD-10-CM

## 2023-05-02 DIAGNOSIS — O34.40 HISTORY OF CERVICAL LEEP BIOPSY AFFECTING CARE OF MOTHER, ANTEPARTUM: Primary | ICD-10-CM

## 2023-05-02 DIAGNOSIS — O43.192 PLACENTA SUCCENTURIATA IN SECOND TRIMESTER: ICD-10-CM

## 2023-05-02 PROCEDURE — 76817 TRANSVAGINAL US OBSTETRIC: CPT | Performed by: OBSTETRICS & GYNECOLOGY

## 2023-05-02 PROCEDURE — 76815 OB US LIMITED FETUS(S): CPT | Performed by: OBSTETRICS & GYNECOLOGY

## 2023-05-02 PROCEDURE — 99999 PR OFFICE/OUTPT VISIT,PROCEDURE ONLY: CPT | Performed by: OBSTETRICS & GYNECOLOGY

## 2023-05-17 ENCOUNTER — ROUTINE PRENATAL (OUTPATIENT)
Dept: PERINATAL CARE | Age: 33
End: 2023-05-17
Payer: COMMERCIAL

## 2023-05-17 VITALS
HEART RATE: 68 BPM | WEIGHT: 151 LBS | HEIGHT: 66 IN | RESPIRATION RATE: 16 BRPM | BODY MASS INDEX: 24.27 KG/M2 | DIASTOLIC BLOOD PRESSURE: 82 MMHG | SYSTOLIC BLOOD PRESSURE: 128 MMHG

## 2023-05-17 DIAGNOSIS — Z98.890 HISTORY OF CERVICAL LEEP BIOPSY AFFECTING CARE OF MOTHER, ANTEPARTUM: ICD-10-CM

## 2023-05-17 DIAGNOSIS — Z3A.23 23 WEEKS GESTATION OF PREGNANCY: ICD-10-CM

## 2023-05-17 DIAGNOSIS — O99.512 ASTHMA AFFECTING PREGNANCY IN SECOND TRIMESTER: ICD-10-CM

## 2023-05-17 DIAGNOSIS — O09.522 MULTIGRAVIDA OF ADVANCED MATERNAL AGE IN SECOND TRIMESTER: ICD-10-CM

## 2023-05-17 DIAGNOSIS — J45.909 ASTHMA AFFECTING PREGNANCY IN SECOND TRIMESTER: ICD-10-CM

## 2023-05-17 DIAGNOSIS — O43.192 PLACENTA SUCCENTURIATA IN SECOND TRIMESTER: ICD-10-CM

## 2023-05-17 DIAGNOSIS — Z36.4 ULTRASOUND FOR ANTENATAL SCREENING FOR FETAL GROWTH RESTRICTION: ICD-10-CM

## 2023-05-17 DIAGNOSIS — O99.312 ALCOHOL USE AFFECTING PREGNANCY IN SECOND TRIMESTER: Primary | ICD-10-CM

## 2023-05-17 DIAGNOSIS — O34.40 HISTORY OF CERVICAL LEEP BIOPSY AFFECTING CARE OF MOTHER, ANTEPARTUM: ICD-10-CM

## 2023-05-17 PROCEDURE — 93325 DOPPLER ECHO COLOR FLOW MAPG: CPT | Performed by: OBSTETRICS & GYNECOLOGY

## 2023-05-17 PROCEDURE — 99999 PR OFFICE/OUTPT VISIT,PROCEDURE ONLY: CPT | Performed by: OBSTETRICS & GYNECOLOGY

## 2023-05-17 PROCEDURE — 76817 TRANSVAGINAL US OBSTETRIC: CPT | Performed by: OBSTETRICS & GYNECOLOGY

## 2023-05-17 PROCEDURE — 76825 ECHO EXAM OF FETAL HEART: CPT | Performed by: OBSTETRICS & GYNECOLOGY

## 2023-05-17 PROCEDURE — 76816 OB US FOLLOW-UP PER FETUS: CPT | Performed by: OBSTETRICS & GYNECOLOGY

## 2023-05-17 PROCEDURE — 76827 ECHO EXAM OF FETAL HEART: CPT | Performed by: OBSTETRICS & GYNECOLOGY

## 2023-05-22 ENCOUNTER — TELEPHONE (OUTPATIENT)
Dept: OBGYN CLINIC | Age: 33
End: 2023-05-22

## 2023-06-20 ENCOUNTER — ROUTINE PRENATAL (OUTPATIENT)
Dept: PERINATAL CARE | Age: 33
End: 2023-06-20
Payer: COMMERCIAL

## 2023-06-20 VITALS
TEMPERATURE: 97.6 F | SYSTOLIC BLOOD PRESSURE: 111 MMHG | HEIGHT: 66 IN | DIASTOLIC BLOOD PRESSURE: 66 MMHG | WEIGHT: 157 LBS | RESPIRATION RATE: 16 BRPM | BODY MASS INDEX: 25.23 KG/M2 | HEART RATE: 91 BPM

## 2023-06-20 DIAGNOSIS — Z3A.28 28 WEEKS GESTATION OF PREGNANCY: ICD-10-CM

## 2023-06-20 DIAGNOSIS — O43.193 PLACENTA SUCCENTURIATA IN THIRD TRIMESTER: Primary | ICD-10-CM

## 2023-06-20 DIAGNOSIS — Z36.4 ULTRASOUND FOR ANTENATAL SCREENING FOR FETAL GROWTH RESTRICTION: ICD-10-CM

## 2023-06-20 DIAGNOSIS — O99.513 ASTHMA AFFECTING PREGNANCY IN THIRD TRIMESTER: ICD-10-CM

## 2023-06-20 DIAGNOSIS — Z98.890 HISTORY OF CERVICAL LEEP BIOPSY AFFECTING CARE OF MOTHER, ANTEPARTUM: ICD-10-CM

## 2023-06-20 DIAGNOSIS — J45.909 ASTHMA AFFECTING PREGNANCY IN THIRD TRIMESTER: ICD-10-CM

## 2023-06-20 DIAGNOSIS — O34.40 HISTORY OF CERVICAL LEEP BIOPSY AFFECTING CARE OF MOTHER, ANTEPARTUM: ICD-10-CM

## 2023-06-20 DIAGNOSIS — O09.523 MULTIGRAVIDA OF ADVANCED MATERNAL AGE IN THIRD TRIMESTER: ICD-10-CM

## 2023-06-20 DIAGNOSIS — Z13.89 ENCOUNTER FOR ROUTINE SCREENING FOR MALFORMATION USING ULTRASONICS: ICD-10-CM

## 2023-06-20 PROCEDURE — 76819 FETAL BIOPHYS PROFIL W/O NST: CPT | Performed by: OBSTETRICS & GYNECOLOGY

## 2023-06-20 PROCEDURE — 99999 PR OFFICE/OUTPT VISIT,PROCEDURE ONLY: CPT | Performed by: OBSTETRICS & GYNECOLOGY

## 2023-06-20 PROCEDURE — 76817 TRANSVAGINAL US OBSTETRIC: CPT | Performed by: OBSTETRICS & GYNECOLOGY

## 2023-06-20 PROCEDURE — 76805 OB US >/= 14 WKS SNGL FETUS: CPT | Performed by: OBSTETRICS & GYNECOLOGY

## 2023-06-20 PROCEDURE — 76820 UMBILICAL ARTERY ECHO: CPT | Performed by: OBSTETRICS & GYNECOLOGY

## 2023-07-05 ENCOUNTER — ROUTINE PRENATAL (OUTPATIENT)
Dept: OBGYN CLINIC | Age: 33
End: 2023-07-05

## 2023-07-05 ENCOUNTER — HOSPITAL ENCOUNTER (OUTPATIENT)
Age: 33
Discharge: HOME | End: 2023-07-05

## 2023-07-05 VITALS
SYSTOLIC BLOOD PRESSURE: 114 MMHG | DIASTOLIC BLOOD PRESSURE: 68 MMHG | WEIGHT: 161 LBS | HEART RATE: 82 BPM | BODY MASS INDEX: 25.99 KG/M2

## 2023-07-05 DIAGNOSIS — Z15.89 MTHFR GENE MUTATION: ICD-10-CM

## 2023-07-05 DIAGNOSIS — O34.40 HISTORY OF LOOP ELECTROSURGICAL EXCISION PROCEDURE (LEEP) OF CERVIX AFFECTING PREGNANCY, ANTEPARTUM: ICD-10-CM

## 2023-07-05 DIAGNOSIS — O43.109 PLACENTAL ABNORMALITY, ANTEPARTUM: ICD-10-CM

## 2023-07-05 DIAGNOSIS — J45.909 ASTHMA, UNSPECIFIED ASTHMA SEVERITY, UNSPECIFIED WHETHER COMPLICATED, UNSPECIFIED WHETHER PERSISTENT: ICD-10-CM

## 2023-07-05 DIAGNOSIS — O09.521 MULTIGRAVIDA OF ADVANCED MATERNAL AGE IN FIRST TRIMESTER: Primary | ICD-10-CM

## 2023-07-05 DIAGNOSIS — I82.A11 ACUTE DEEP VEIN THROMBOSIS (DVT) OF AXILLARY VEIN OF RIGHT UPPER EXTREMITY (HCC): ICD-10-CM

## 2023-07-05 DIAGNOSIS — O09.891 MATERNAL EXPOSURE TO ALCOHOL IN FIRST TRIMESTER: ICD-10-CM

## 2023-07-05 DIAGNOSIS — Z3A.30 30 WEEKS GESTATION OF PREGNANCY: ICD-10-CM

## 2023-07-05 DIAGNOSIS — Z98.890 HISTORY OF LOOP ELECTROSURGICAL EXCISION PROCEDURE (LEEP) OF CERVIX AFFECTING PREGNANCY, ANTEPARTUM: ICD-10-CM

## 2023-07-05 PROCEDURE — 1036F TOBACCO NON-USER: CPT | Performed by: NURSE PRACTITIONER

## 2023-07-05 PROCEDURE — 0502F SUBSEQUENT PRENATAL CARE: CPT | Performed by: NURSE PRACTITIONER

## 2023-07-05 PROCEDURE — G8419 CALC BMI OUT NRM PARAM NOF/U: HCPCS | Performed by: NURSE PRACTITIONER

## 2023-07-05 PROCEDURE — G8427 DOCREV CUR MEDS BY ELIG CLIN: HCPCS | Performed by: NURSE PRACTITIONER

## 2023-07-05 NOTE — PROGRESS NOTES
Final    AFP Interp 2023 Screen Neg   Final    Comment: (NOTE)  INTERPRETATION: SCREEN NEGATIVE for open spina bifida                               Neural Tube Defects (NTD)   Negative                                                               Pre-Test     Post-Test    Cutoff                                      Neural Tube Defects Risks   1:1030       < 1:35837    1:250                                       Comments: The risk of an open neural tube defect is less than the  screening cut-off. This test was developed and its performance characteristics   determined by Darrel Barrett. It has not been cleared or   approved by the [employer] and Drug Administration. This test was   performed in a CLIA certified laboratory and is intended for   clinical purposes. Maternal Age At NIA 1823 35.2  yr Final    Patient Weight 2023 142.0 lbs. Final    Est Due Date 2023 [id number]   Final    Gestational Age 2023 15 wks, 3 days   Final    Dating Method 2023 LMP   Final    Number of Fetuses 2023 [facility]   Final    Race 2023 Nonblack   Final    Insulin Req Diabetes 2023 No   Final    Smoking 2023 No   Final    History 2023 No   Final    AFP Specimen 2023 See Note   Final    Comment: (NOTE)  Previous Negative  Performed by Darrel Barrett,  0 [facility] [phone]  www. Judith Blackwell MD, PHD, Lab. Director      Send Out Report 2023 FORWARD [employer] KIT   Final   ]    2023 fetal  testing with NST in office at least weekly. Can be done in primary OB office. Starting at 32 weeks. 23 Pt to consider Tdap for next visit. 2023 Daily baby ASA 81mg PO for prevention of preeclampsia in pregnant women at high risk recommended by USPSTF/ACOG.       T-Dap Vaccine (27-36 weeks): awaiting    The patient was instructed on fetal kick counts and was

## 2023-07-08 ENCOUNTER — HOSPITAL ENCOUNTER (OUTPATIENT)
Age: 33
Discharge: HOME | End: 2023-07-08
Payer: COMMERCIAL

## 2023-07-08 DIAGNOSIS — O43.109 PLACENTAL ABNORMALITY, ANTEPARTUM: ICD-10-CM

## 2023-07-08 DIAGNOSIS — Z3A.27 27 WEEKS GESTATION OF PREGNANCY: ICD-10-CM

## 2023-07-08 DIAGNOSIS — J45.909 ASTHMA, UNSPECIFIED ASTHMA SEVERITY, UNSPECIFIED WHETHER COMPLICATED, UNSPECIFIED WHETHER PERSISTENT: ICD-10-CM

## 2023-07-08 DIAGNOSIS — Z15.89 MTHFR GENE MUTATION: ICD-10-CM

## 2023-07-08 DIAGNOSIS — O09.92 HIGH-RISK PREGNANCY IN SECOND TRIMESTER: ICD-10-CM

## 2023-07-08 LAB
ABSOLUTE BANDS #: 0.2 K/UL (ref 0–1)
BANDS: 2 % (ref 0–10)
BASOPHILS # BLD: 0 K/UL (ref 0–0.2)
BASOPHILS NFR BLD: 0 % (ref 0–2)
BILIRUB UR QL STRIP: NEGATIVE
CLARITY UR: CLEAR
COLOR UR: YELLOW
COMMENT UA: NORMAL
EOSINOPHIL # BLD: 0 K/UL (ref 0–0.4)
EOSINOPHILS RELATIVE PERCENT: 0 % (ref 0–4)
ERYTHROCYTE [DISTWIDTH] IN BLOOD BY AUTOMATED COUNT: 13.2 % (ref 11.5–14.9)
GLUCOSE 1H P 50 G GLC PO SERPL-MCNC: 99 MG/DL (ref 70–135)
GLUCOSE ADMINISTRATION: NORMAL
GLUCOSE UR STRIP-MCNC: NEGATIVE MG/DL
HCT VFR BLD AUTO: 36.4 % (ref 36–46)
HGB BLD-MCNC: 12.4 G/DL (ref 12–16)
HGB UR QL STRIP.AUTO: NEGATIVE
KETONES UR STRIP-MCNC: NEGATIVE MG/DL
LEUKOCYTE ESTERASE UR QL STRIP: NEGATIVE
LYMPHOCYTES # BLD: 10 % (ref 24–44)
LYMPHOCYTES NFR BLD: 0.98 K/UL (ref 1–4.8)
MCH RBC QN AUTO: 31.6 PG (ref 26–34)
MCHC RBC AUTO-ENTMCNC: 34 G/DL (ref 31–37)
MCV RBC AUTO: 93.2 FL (ref 80–100)
METAMYELOCYTES ABSOLUTE COUNT: 0.1 K/UL
METAMYELOCYTES: 1 %
MONOCYTES NFR BLD: 0.49 K/UL (ref 0.1–1.3)
MONOCYTES NFR BLD: 5 % (ref 1–7)
MORPHOLOGY: NORMAL
NEUTROPHILS NFR BLD: 82 % (ref 36–66)
NEUTS SEG NFR BLD: 8.03 K/UL (ref 1.3–9.1)
NITRITE UR QL STRIP: NEGATIVE
PH UR STRIP: 7 [PH] (ref 5–8)
PLATELET # BLD AUTO: 180 K/UL (ref 150–450)
PMV BLD AUTO: 8.4 FL (ref 6–12)
PROT UR STRIP-MCNC: NEGATIVE MG/DL
RBC # BLD AUTO: 3.9 M/UL (ref 4–5.2)
SP GR UR STRIP: 1.01 (ref 1–1.03)
UROBILINOGEN UR STRIP-ACNC: NORMAL
WBC OTHER # BLD: 9.8 K/UL (ref 3.5–11)

## 2023-07-08 PROCEDURE — 85027 COMPLETE CBC AUTOMATED: CPT

## 2023-07-08 PROCEDURE — 81003 URINALYSIS AUTO W/O SCOPE: CPT

## 2023-07-08 PROCEDURE — 82950 GLUCOSE TEST: CPT

## 2023-07-08 PROCEDURE — 36415 COLL VENOUS BLD VENIPUNCTURE: CPT

## 2023-07-11 ENCOUNTER — TELEPHONE (OUTPATIENT)
Dept: ONCOLOGY | Age: 33
End: 2023-07-11

## 2023-07-11 ENCOUNTER — OFFICE VISIT (OUTPATIENT)
Dept: ONCOLOGY | Age: 33
End: 2023-07-11
Payer: COMMERCIAL

## 2023-07-11 VITALS
RESPIRATION RATE: 18 BRPM | DIASTOLIC BLOOD PRESSURE: 72 MMHG | TEMPERATURE: 96.9 F | OXYGEN SATURATION: 100 % | WEIGHT: 165.4 LBS | SYSTOLIC BLOOD PRESSURE: 109 MMHG | BODY MASS INDEX: 26.7 KG/M2 | HEART RATE: 76 BPM

## 2023-07-11 DIAGNOSIS — Z15.89 MTHFR GENE MUTATION: Primary | ICD-10-CM

## 2023-07-11 PROCEDURE — 1036F TOBACCO NON-USER: CPT | Performed by: INTERNAL MEDICINE

## 2023-07-11 PROCEDURE — 99211 OFF/OP EST MAY X REQ PHY/QHP: CPT | Performed by: INTERNAL MEDICINE

## 2023-07-11 PROCEDURE — G8419 CALC BMI OUT NRM PARAM NOF/U: HCPCS | Performed by: INTERNAL MEDICINE

## 2023-07-11 PROCEDURE — G8427 DOCREV CUR MEDS BY ELIG CLIN: HCPCS | Performed by: INTERNAL MEDICINE

## 2023-07-11 PROCEDURE — 99214 OFFICE O/P EST MOD 30 MIN: CPT | Performed by: INTERNAL MEDICINE

## 2023-07-11 NOTE — TELEPHONE ENCOUNTER
AVS from 7/11/23      RTC 4  months with cbc    Rv sched for 11/14 @ 8:45 am with labs at md visit    Pt was given AVS and appointment schedule    Electronically signed by Akilah Driver on 7/11/2023 at 10:53 AM

## 2023-07-11 NOTE — PROGRESS NOTES
Patient ID: Sandra Gitelman, 1990, [id number], 26 y.o. 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 female was seen during initial consultation visit for history of right upper extremity DVT, appears unprovoked. Patient had her first episode of DVT in March 2019 when she was in [place]. She reports that she was very active at that time doing some exercise classes and Pilates and presented with right upper extremity swelling and her ultrasound Doppler on 3/31/2019 showed right upper DVT in the subclavian vein. She also had a CT of the chest on that day which showed several bilateral segmental and subsegmental pulmonary emboli. She was treated with 6 months of anticoagulation with Xarelto. She was following with vascular surgery there in [address] [place] [address] and subsequently her anticoagulation was discontinued. She never had thrombophilia work-up at that time and her DVT was thought secondary to her vigorous exercise. She denies any personal history of miscarriages in the first trimester. Currently she is pregnant with first trimester and expected date of delivery on September 5. She was referred to hematology for her history of DVT in the past.  She report history of grandfather having CVA in 46s. Also history of multiple miscarriages in her paternal cousin. No history of lupus or any autoimmune conditions in the family. She is very active here and now moved to [facility] in 2020. She denies any history of smoking or alcohol abuse. Interval history: We will return to discuss lab results and further imaging. Reviewed recent September.   She is following with OB/GYN and her pregnancy is

## 2023-07-25 ENCOUNTER — ROUTINE PRENATAL (OUTPATIENT)
Dept: OBGYN CLINIC | Age: 33
End: 2023-07-25
Payer: COMMERCIAL

## 2023-07-25 ENCOUNTER — ROUTINE PRENATAL (OUTPATIENT)
Dept: PERINATAL CARE | Age: 33
End: 2023-07-25
Payer: COMMERCIAL

## 2023-07-25 VITALS
HEIGHT: 66 IN | RESPIRATION RATE: 16 BRPM | WEIGHT: 166 LBS | HEART RATE: 80 BPM | SYSTOLIC BLOOD PRESSURE: 116 MMHG | DIASTOLIC BLOOD PRESSURE: 64 MMHG | TEMPERATURE: 98.3 F | BODY MASS INDEX: 26.68 KG/M2

## 2023-07-25 VITALS
WEIGHT: 166 LBS | HEART RATE: 72 BPM | SYSTOLIC BLOOD PRESSURE: 105 MMHG | BODY MASS INDEX: 26.79 KG/M2 | DIASTOLIC BLOOD PRESSURE: 66 MMHG

## 2023-07-25 DIAGNOSIS — O09.521 MULTIGRAVIDA OF ADVANCED MATERNAL AGE IN FIRST TRIMESTER: ICD-10-CM

## 2023-07-25 DIAGNOSIS — I82.A11 ACUTE DEEP VEIN THROMBOSIS (DVT) OF AXILLARY VEIN OF RIGHT UPPER EXTREMITY (HCC): ICD-10-CM

## 2023-07-25 DIAGNOSIS — O09.523 MULTIGRAVIDA OF ADVANCED MATERNAL AGE IN THIRD TRIMESTER: ICD-10-CM

## 2023-07-25 DIAGNOSIS — O34.40 HISTORY OF LOOP ELECTROSURGICAL EXCISION PROCEDURE (LEEP) OF CERVIX AFFECTING PREGNANCY, ANTEPARTUM: ICD-10-CM

## 2023-07-25 DIAGNOSIS — Z15.89 MTHFR GENE MUTATION: ICD-10-CM

## 2023-07-25 DIAGNOSIS — J45.909 ASTHMA AFFECTING PREGNANCY IN THIRD TRIMESTER: ICD-10-CM

## 2023-07-25 DIAGNOSIS — Z98.890 HISTORY OF LOOP ELECTROSURGICAL EXCISION PROCEDURE (LEEP) OF CERVIX AFFECTING PREGNANCY, ANTEPARTUM: ICD-10-CM

## 2023-07-25 DIAGNOSIS — J45.909 ASTHMA, UNSPECIFIED ASTHMA SEVERITY, UNSPECIFIED WHETHER COMPLICATED, UNSPECIFIED WHETHER PERSISTENT: ICD-10-CM

## 2023-07-25 DIAGNOSIS — O09.891 MATERNAL EXPOSURE TO ALCOHOL IN FIRST TRIMESTER: ICD-10-CM

## 2023-07-25 DIAGNOSIS — O99.283 METHYLENETETRAHYDROFOLATE REDUCTASE DEFICIENCY AFFECTING PREGNANCY IN THIRD TRIMESTER (HCC): ICD-10-CM

## 2023-07-25 DIAGNOSIS — O43.109 PLACENTAL ABNORMALITY, ANTEPARTUM: ICD-10-CM

## 2023-07-25 DIAGNOSIS — Z36.4 ULTRASOUND FOR ANTENATAL SCREENING FOR FETAL GROWTH RESTRICTION: ICD-10-CM

## 2023-07-25 DIAGNOSIS — Z3A.33 33 WEEKS GESTATION OF PREGNANCY: ICD-10-CM

## 2023-07-25 DIAGNOSIS — O09.93 HIGH-RISK PREGNANCY IN THIRD TRIMESTER: Primary | ICD-10-CM

## 2023-07-25 DIAGNOSIS — O43.193 PLACENTA SUCCENTURIATA IN THIRD TRIMESTER: Primary | ICD-10-CM

## 2023-07-25 DIAGNOSIS — E72.12 METHYLENETETRAHYDROFOLATE REDUCTASE DEFICIENCY AFFECTING PREGNANCY IN THIRD TRIMESTER (HCC): ICD-10-CM

## 2023-07-25 DIAGNOSIS — O99.513 ASTHMA AFFECTING PREGNANCY IN THIRD TRIMESTER: ICD-10-CM

## 2023-07-25 PROCEDURE — 76819 FETAL BIOPHYS PROFIL W/O NST: CPT | Performed by: OBSTETRICS & GYNECOLOGY

## 2023-07-25 PROCEDURE — 59025 FETAL NON-STRESS TEST: CPT | Performed by: OBSTETRICS & GYNECOLOGY

## 2023-07-25 PROCEDURE — G8427 DOCREV CUR MEDS BY ELIG CLIN: HCPCS | Performed by: OBSTETRICS & GYNECOLOGY

## 2023-07-25 PROCEDURE — 99214 OFFICE O/P EST MOD 30 MIN: CPT | Performed by: OBSTETRICS & GYNECOLOGY

## 2023-07-25 PROCEDURE — 1036F TOBACCO NON-USER: CPT | Performed by: OBSTETRICS & GYNECOLOGY

## 2023-07-25 PROCEDURE — 76816 OB US FOLLOW-UP PER FETUS: CPT | Performed by: OBSTETRICS & GYNECOLOGY

## 2023-07-25 PROCEDURE — 76820 UMBILICAL ARTERY ECHO: CPT | Performed by: OBSTETRICS & GYNECOLOGY

## 2023-07-25 PROCEDURE — G8419 CALC BMI OUT NRM PARAM NOF/U: HCPCS | Performed by: OBSTETRICS & GYNECOLOGY

## 2023-07-25 PROCEDURE — 99999 PR OFFICE/OUTPT VISIT,PROCEDURE ONLY: CPT | Performed by: OBSTETRICS & GYNECOLOGY

## 2023-08-01 ENCOUNTER — ROUTINE PRENATAL (OUTPATIENT)
Dept: PERINATAL CARE | Age: 33
End: 2023-08-01
Payer: COMMERCIAL

## 2023-08-01 VITALS
HEIGHT: 66 IN | RESPIRATION RATE: 16 BRPM | HEART RATE: 95 BPM | WEIGHT: 166 LBS | SYSTOLIC BLOOD PRESSURE: 106 MMHG | BODY MASS INDEX: 26.68 KG/M2 | DIASTOLIC BLOOD PRESSURE: 65 MMHG | TEMPERATURE: 98 F

## 2023-08-01 DIAGNOSIS — E72.12 METHYLENETETRAHYDROFOLATE REDUCTASE DEFICIENCY AFFECTING PREGNANCY IN THIRD TRIMESTER (HCC): ICD-10-CM

## 2023-08-01 DIAGNOSIS — Z3A.34 34 WEEKS GESTATION OF PREGNANCY: ICD-10-CM

## 2023-08-01 DIAGNOSIS — O43.193 PLACENTA SUCCENTURIATA IN THIRD TRIMESTER: Primary | ICD-10-CM

## 2023-08-01 DIAGNOSIS — J45.909 ASTHMA AFFECTING PREGNANCY IN THIRD TRIMESTER: ICD-10-CM

## 2023-08-01 DIAGNOSIS — O99.283 METHYLENETETRAHYDROFOLATE REDUCTASE DEFICIENCY AFFECTING PREGNANCY IN THIRD TRIMESTER (HCC): ICD-10-CM

## 2023-08-01 DIAGNOSIS — O99.513 ASTHMA AFFECTING PREGNANCY IN THIRD TRIMESTER: ICD-10-CM

## 2023-08-01 DIAGNOSIS — O09.523 MULTIGRAVIDA OF ADVANCED MATERNAL AGE IN THIRD TRIMESTER: ICD-10-CM

## 2023-08-01 PROCEDURE — 99999 PR OFFICE/OUTPT VISIT,PROCEDURE ONLY: CPT | Performed by: OBSTETRICS & GYNECOLOGY

## 2023-08-01 PROCEDURE — 76819 FETAL BIOPHYS PROFIL W/O NST: CPT | Performed by: OBSTETRICS & GYNECOLOGY

## 2023-08-01 PROCEDURE — 76820 UMBILICAL ARTERY ECHO: CPT | Performed by: OBSTETRICS & GYNECOLOGY

## 2023-08-01 PROCEDURE — 76815 OB US LIMITED FETUS(S): CPT | Performed by: OBSTETRICS & GYNECOLOGY

## 2023-08-02 ENCOUNTER — ROUTINE PRENATAL (OUTPATIENT)
Dept: OBGYN CLINIC | Age: 33
End: 2023-08-02
Payer: COMMERCIAL

## 2023-08-02 VITALS
WEIGHT: 167 LBS | HEART RATE: 75 BPM | DIASTOLIC BLOOD PRESSURE: 68 MMHG | BODY MASS INDEX: 26.95 KG/M2 | SYSTOLIC BLOOD PRESSURE: 112 MMHG

## 2023-08-02 DIAGNOSIS — N94.9 ROUND LIGAMENT PAIN: Primary | ICD-10-CM

## 2023-08-02 DIAGNOSIS — O09.93 HIGH-RISK PREGNANCY IN THIRD TRIMESTER: Primary | ICD-10-CM

## 2023-08-02 DIAGNOSIS — O34.40 HISTORY OF LOOP ELECTROSURGICAL EXCISION PROCEDURE (LEEP) OF CERVIX AFFECTING PREGNANCY, ANTEPARTUM: ICD-10-CM

## 2023-08-02 DIAGNOSIS — O09.521 MULTIGRAVIDA OF ADVANCED MATERNAL AGE IN FIRST TRIMESTER: ICD-10-CM

## 2023-08-02 DIAGNOSIS — J45.909 ASTHMA, UNSPECIFIED ASTHMA SEVERITY, UNSPECIFIED WHETHER COMPLICATED, UNSPECIFIED WHETHER PERSISTENT: ICD-10-CM

## 2023-08-02 DIAGNOSIS — M62.89 PELVIC FLOOR DYSFUNCTION IN FEMALE: ICD-10-CM

## 2023-08-02 DIAGNOSIS — O43.109 PLACENTAL ABNORMALITY, ANTEPARTUM: ICD-10-CM

## 2023-08-02 DIAGNOSIS — I82.A11 ACUTE DEEP VEIN THROMBOSIS (DVT) OF AXILLARY VEIN OF RIGHT UPPER EXTREMITY (HCC): ICD-10-CM

## 2023-08-02 DIAGNOSIS — Z15.89 MTHFR GENE MUTATION: ICD-10-CM

## 2023-08-02 DIAGNOSIS — Z98.890 HISTORY OF LOOP ELECTROSURGICAL EXCISION PROCEDURE (LEEP) OF CERVIX AFFECTING PREGNANCY, ANTEPARTUM: ICD-10-CM

## 2023-08-02 DIAGNOSIS — O09.891 MATERNAL EXPOSURE TO ALCOHOL IN FIRST TRIMESTER: ICD-10-CM

## 2023-08-02 DIAGNOSIS — Z3A.34 34 WEEKS GESTATION OF PREGNANCY: ICD-10-CM

## 2023-08-02 PROCEDURE — G8427 DOCREV CUR MEDS BY ELIG CLIN: HCPCS | Performed by: OBSTETRICS & GYNECOLOGY

## 2023-08-02 PROCEDURE — 1036F TOBACCO NON-USER: CPT | Performed by: OBSTETRICS & GYNECOLOGY

## 2023-08-02 PROCEDURE — 59025 FETAL NON-STRESS TEST: CPT | Performed by: OBSTETRICS & GYNECOLOGY

## 2023-08-02 PROCEDURE — 99214 OFFICE O/P EST MOD 30 MIN: CPT | Performed by: OBSTETRICS & GYNECOLOGY

## 2023-08-02 PROCEDURE — G8419 CALC BMI OUT NRM PARAM NOF/U: HCPCS | Performed by: OBSTETRICS & GYNECOLOGY

## 2023-08-08 ENCOUNTER — ROUTINE PRENATAL (OUTPATIENT)
Dept: OBGYN CLINIC | Age: 33
End: 2023-08-08
Payer: COMMERCIAL

## 2023-08-08 ENCOUNTER — ROUTINE PRENATAL (OUTPATIENT)
Dept: PERINATAL CARE | Age: 33
End: 2023-08-08
Payer: COMMERCIAL

## 2023-08-08 VITALS
HEART RATE: 79 BPM | DIASTOLIC BLOOD PRESSURE: 61 MMHG | SYSTOLIC BLOOD PRESSURE: 96 MMHG | BODY MASS INDEX: 27.44 KG/M2 | WEIGHT: 170 LBS

## 2023-08-08 VITALS
HEIGHT: 66 IN | RESPIRATION RATE: 16 BRPM | WEIGHT: 169 LBS | HEART RATE: 81 BPM | TEMPERATURE: 98.2 F | BODY MASS INDEX: 27.16 KG/M2 | SYSTOLIC BLOOD PRESSURE: 106 MMHG | DIASTOLIC BLOOD PRESSURE: 65 MMHG

## 2023-08-08 DIAGNOSIS — Z3A.35 35 WEEKS GESTATION OF PREGNANCY: ICD-10-CM

## 2023-08-08 DIAGNOSIS — E72.12 METHYLENETETRAHYDROFOLATE REDUCTASE DEFICIENCY AFFECTING PREGNANCY IN THIRD TRIMESTER (HCC): ICD-10-CM

## 2023-08-08 DIAGNOSIS — O34.40 HISTORY OF LOOP ELECTROSURGICAL EXCISION PROCEDURE (LEEP) OF CERVIX AFFECTING PREGNANCY, ANTEPARTUM: ICD-10-CM

## 2023-08-08 DIAGNOSIS — O09.521 MULTIGRAVIDA OF ADVANCED MATERNAL AGE IN FIRST TRIMESTER: ICD-10-CM

## 2023-08-08 DIAGNOSIS — O99.513 ASTHMA AFFECTING PREGNANCY IN THIRD TRIMESTER: ICD-10-CM

## 2023-08-08 DIAGNOSIS — Z15.89 MTHFR GENE MUTATION: ICD-10-CM

## 2023-08-08 DIAGNOSIS — O43.109 PLACENTAL ABNORMALITY, ANTEPARTUM: ICD-10-CM

## 2023-08-08 DIAGNOSIS — O09.891 MATERNAL EXPOSURE TO ALCOHOL IN FIRST TRIMESTER: ICD-10-CM

## 2023-08-08 DIAGNOSIS — Z98.890 HISTORY OF LOOP ELECTROSURGICAL EXCISION PROCEDURE (LEEP) OF CERVIX AFFECTING PREGNANCY, ANTEPARTUM: ICD-10-CM

## 2023-08-08 DIAGNOSIS — I82.A11 ACUTE DEEP VEIN THROMBOSIS (DVT) OF AXILLARY VEIN OF RIGHT UPPER EXTREMITY (HCC): ICD-10-CM

## 2023-08-08 DIAGNOSIS — O09.93 HIGH-RISK PREGNANCY IN THIRD TRIMESTER: Primary | ICD-10-CM

## 2023-08-08 DIAGNOSIS — O99.283 METHYLENETETRAHYDROFOLATE REDUCTASE DEFICIENCY AFFECTING PREGNANCY IN THIRD TRIMESTER (HCC): ICD-10-CM

## 2023-08-08 DIAGNOSIS — J45.909 ASTHMA, UNSPECIFIED ASTHMA SEVERITY, UNSPECIFIED WHETHER COMPLICATED, UNSPECIFIED WHETHER PERSISTENT: ICD-10-CM

## 2023-08-08 DIAGNOSIS — O09.523 MULTIGRAVIDA OF ADVANCED MATERNAL AGE IN THIRD TRIMESTER: ICD-10-CM

## 2023-08-08 DIAGNOSIS — J45.909 ASTHMA AFFECTING PREGNANCY IN THIRD TRIMESTER: ICD-10-CM

## 2023-08-08 DIAGNOSIS — O43.193 PLACENTA SUCCENTURIATA IN THIRD TRIMESTER: Primary | ICD-10-CM

## 2023-08-08 PROCEDURE — G8427 DOCREV CUR MEDS BY ELIG CLIN: HCPCS | Performed by: OBSTETRICS & GYNECOLOGY

## 2023-08-08 PROCEDURE — 99999 PR OFFICE/OUTPT VISIT,PROCEDURE ONLY: CPT | Performed by: OBSTETRICS & GYNECOLOGY

## 2023-08-08 PROCEDURE — 59025 FETAL NON-STRESS TEST: CPT | Performed by: OBSTETRICS & GYNECOLOGY

## 2023-08-08 PROCEDURE — 1036F TOBACCO NON-USER: CPT | Performed by: OBSTETRICS & GYNECOLOGY

## 2023-08-08 PROCEDURE — G8419 CALC BMI OUT NRM PARAM NOF/U: HCPCS | Performed by: OBSTETRICS & GYNECOLOGY

## 2023-08-08 PROCEDURE — 99214 OFFICE O/P EST MOD 30 MIN: CPT | Performed by: OBSTETRICS & GYNECOLOGY

## 2023-08-08 PROCEDURE — 76819 FETAL BIOPHYS PROFIL W/O NST: CPT | Performed by: OBSTETRICS & GYNECOLOGY

## 2023-08-08 PROCEDURE — 76815 OB US LIMITED FETUS(S): CPT | Performed by: OBSTETRICS & GYNECOLOGY

## 2023-08-08 PROCEDURE — 76820 UMBILICAL ARTERY ECHO: CPT | Performed by: OBSTETRICS & GYNECOLOGY

## 2023-08-15 ENCOUNTER — HOSPITAL ENCOUNTER (OUTPATIENT)
Dept: PHYSICAL THERAPY | Facility: CLINIC | Age: 33
Setting detail: THERAPIES SERIES
Discharge: HOME | End: 2023-08-15
Payer: COMMERCIAL

## 2023-08-15 ENCOUNTER — ROUTINE PRENATAL (OUTPATIENT)
Dept: PERINATAL CARE | Age: 33
End: 2023-08-15
Payer: COMMERCIAL

## 2023-08-15 VITALS
SYSTOLIC BLOOD PRESSURE: 98 MMHG | RESPIRATION RATE: 16 BRPM | TEMPERATURE: 97.5 F | HEART RATE: 80 BPM | WEIGHT: 171 LBS | HEIGHT: 66 IN | DIASTOLIC BLOOD PRESSURE: 60 MMHG | BODY MASS INDEX: 27.48 KG/M2

## 2023-08-15 DIAGNOSIS — Z3A.36 36 WEEKS GESTATION OF PREGNANCY: ICD-10-CM

## 2023-08-15 DIAGNOSIS — Z36.4 ULTRASOUND FOR ANTENATAL SCREENING FOR FETAL GROWTH RESTRICTION: ICD-10-CM

## 2023-08-15 DIAGNOSIS — E72.12 METHYLENETETRAHYDROFOLATE REDUCTASE DEFICIENCY AFFECTING PREGNANCY IN THIRD TRIMESTER (HCC): ICD-10-CM

## 2023-08-15 DIAGNOSIS — O43.193 PLACENTA SUCCENTURIATA IN THIRD TRIMESTER: Primary | ICD-10-CM

## 2023-08-15 DIAGNOSIS — J45.909 ASTHMA AFFECTING PREGNANCY IN THIRD TRIMESTER: ICD-10-CM

## 2023-08-15 DIAGNOSIS — O99.513 ASTHMA AFFECTING PREGNANCY IN THIRD TRIMESTER: ICD-10-CM

## 2023-08-15 DIAGNOSIS — Z13.89 ENCOUNTER FOR ROUTINE SCREENING FOR MALFORMATION USING ULTRASONICS: ICD-10-CM

## 2023-08-15 DIAGNOSIS — O99.283 METHYLENETETRAHYDROFOLATE REDUCTASE DEFICIENCY AFFECTING PREGNANCY IN THIRD TRIMESTER (HCC): ICD-10-CM

## 2023-08-15 DIAGNOSIS — O09.523 MULTIGRAVIDA OF ADVANCED MATERNAL AGE IN THIRD TRIMESTER: ICD-10-CM

## 2023-08-15 PROCEDURE — 97161 PT EVAL LOW COMPLEX 20 MIN: CPT

## 2023-08-15 PROCEDURE — 76805 OB US >/= 14 WKS SNGL FETUS: CPT | Performed by: OBSTETRICS & GYNECOLOGY

## 2023-08-15 PROCEDURE — 76819 FETAL BIOPHYS PROFIL W/O NST: CPT | Performed by: OBSTETRICS & GYNECOLOGY

## 2023-08-15 PROCEDURE — 76820 UMBILICAL ARTERY ECHO: CPT | Performed by: OBSTETRICS & GYNECOLOGY

## 2023-08-15 PROCEDURE — 97530 THERAPEUTIC ACTIVITIES: CPT

## 2023-08-15 PROCEDURE — 97140 MANUAL THERAPY 1/> REGIONS: CPT

## 2023-08-15 PROCEDURE — 99999 PR OFFICE/OUTPT VISIT,PROCEDURE ONLY: CPT | Performed by: OBSTETRICS & GYNECOLOGY

## 2023-08-15 NOTE — CONSULTS
[] [address]   [address]  P: [phone]  F: [phone] [x] [address]  151 [facility]  P: [phone]  F: [phone]     Physical Therapy General Evaluation/Pregnancy    Date:  8/15/2023  Patient: Carlos Alberto Fortune  : 1990  MRN: [id number]  Physician: Vandana Acosta DO     Insurance: 1. [employer], auth after eval, ded 3000/met, oop max 6000/4232.94 rem, $60 copay  2. [place] visits  auth after , no pt liab  Medical Diagnosis:   N94.9 (ICD-10-CM) - Round ligament pain   M62.89 (ICD-10-CM) - Pelvic floor dysfunction in female       Rehab Codes: M25.551, N81.84, R27.8, R29.3, M62.81  Onset Date: 2023                                  Next 's appt: 23    Subjective:   HPI/CC: Patient reports to physical therapy with (R) hip pain secondary to being 36 weeks gestation. Patient reports that in 2023 she began to experience (R) hip pain. Patient reports that she was doing some stretches in order to reduce tightness in the hips. Notes that the next morning she woke up with increased pain. Patient reports that she has now modified her movements in order to reduce (R) hip pain. Notes that she has stopped exercising, has modified the way she stands up and has modified bed mobility due to the pain. Patient describes pain as dull and can be sharp with quick movements. Notes reduced hip ER and decrease STS helps reduce pain.      PMHx: [x] Unremarkable [] Diabetes [] HTN [] Pacemaker   [] MI/Heart Problems [] Cancer [] Arthritis [] Other:              [x] Refer to full medical chart  In EPIC       Comorbidities:   [] Obesity [] Dialysis  [] N/A   [] Asthma/COPD [] Dementia [] Other:   [] Stroke [] Sleep apnea [] Other:   [] Vascular disease [] Rheumatic disease [] Other:     Tests:  [] X-Ray: [] MRI:  [] Other:    Medications: [x] Refer to

## 2023-08-16 ENCOUNTER — ROUTINE PRENATAL (OUTPATIENT)
Dept: OBGYN CLINIC | Age: 33
End: 2023-08-16

## 2023-08-16 ENCOUNTER — HOSPITAL ENCOUNTER (OUTPATIENT)
Age: 33
Setting detail: SPECIMEN
Discharge: HOME | End: 2023-08-16

## 2023-08-16 VITALS
DIASTOLIC BLOOD PRESSURE: 62 MMHG | BODY MASS INDEX: 27.76 KG/M2 | HEART RATE: 74 BPM | SYSTOLIC BLOOD PRESSURE: 102 MMHG | WEIGHT: 172 LBS

## 2023-08-16 DIAGNOSIS — J45.909 ASTHMA, UNSPECIFIED ASTHMA SEVERITY, UNSPECIFIED WHETHER COMPLICATED, UNSPECIFIED WHETHER PERSISTENT: ICD-10-CM

## 2023-08-16 DIAGNOSIS — Z15.89 MTHFR GENE MUTATION: ICD-10-CM

## 2023-08-16 DIAGNOSIS — O09.93 HIGH-RISK PREGNANCY IN THIRD TRIMESTER: ICD-10-CM

## 2023-08-16 DIAGNOSIS — O09.891 MATERNAL EXPOSURE TO ALCOHOL IN FIRST TRIMESTER: ICD-10-CM

## 2023-08-16 DIAGNOSIS — Z3A.36 36 WEEKS GESTATION OF PREGNANCY: ICD-10-CM

## 2023-08-16 DIAGNOSIS — Z98.890 HISTORY OF LOOP ELECTROSURGICAL EXCISION PROCEDURE (LEEP) OF CERVIX AFFECTING PREGNANCY, ANTEPARTUM: ICD-10-CM

## 2023-08-16 DIAGNOSIS — I82.A11 ACUTE DEEP VEIN THROMBOSIS (DVT) OF AXILLARY VEIN OF RIGHT UPPER EXTREMITY (HCC): ICD-10-CM

## 2023-08-16 DIAGNOSIS — O43.109 PLACENTAL ABNORMALITY, ANTEPARTUM: ICD-10-CM

## 2023-08-16 DIAGNOSIS — O09.93 HIGH-RISK PREGNANCY IN THIRD TRIMESTER: Primary | ICD-10-CM

## 2023-08-16 DIAGNOSIS — O34.40 HISTORY OF LOOP ELECTROSURGICAL EXCISION PROCEDURE (LEEP) OF CERVIX AFFECTING PREGNANCY, ANTEPARTUM: ICD-10-CM

## 2023-08-16 DIAGNOSIS — O09.521 MULTIGRAVIDA OF ADVANCED MATERNAL AGE IN FIRST TRIMESTER: ICD-10-CM

## 2023-08-19 LAB
MICROORGANISM SPEC CULT: NORMAL
SPECIMEN DESCRIPTION: NORMAL

## 2023-08-22 ENCOUNTER — ROUTINE PRENATAL (OUTPATIENT)
Dept: PERINATAL CARE | Age: 33
End: 2023-08-22
Payer: COMMERCIAL

## 2023-08-22 VITALS
HEIGHT: 66 IN | DIASTOLIC BLOOD PRESSURE: 70 MMHG | SYSTOLIC BLOOD PRESSURE: 114 MMHG | RESPIRATION RATE: 16 BRPM | HEART RATE: 86 BPM | TEMPERATURE: 97.2 F | WEIGHT: 178 LBS | BODY MASS INDEX: 28.61 KG/M2

## 2023-08-22 DIAGNOSIS — O09.523 MULTIGRAVIDA OF ADVANCED MATERNAL AGE IN THIRD TRIMESTER: ICD-10-CM

## 2023-08-22 DIAGNOSIS — Z3A.37 37 WEEKS GESTATION OF PREGNANCY: ICD-10-CM

## 2023-08-22 DIAGNOSIS — O43.193 PLACENTA SUCCENTURIATA IN THIRD TRIMESTER: Primary | ICD-10-CM

## 2023-08-22 DIAGNOSIS — O99.283 METHYLENETETRAHYDROFOLATE REDUCTASE DEFICIENCY AFFECTING PREGNANCY IN THIRD TRIMESTER (HCC): ICD-10-CM

## 2023-08-22 DIAGNOSIS — O99.513 ASTHMA AFFECTING PREGNANCY IN THIRD TRIMESTER: ICD-10-CM

## 2023-08-22 DIAGNOSIS — J45.909 ASTHMA AFFECTING PREGNANCY IN THIRD TRIMESTER: ICD-10-CM

## 2023-08-22 DIAGNOSIS — E72.12 METHYLENETETRAHYDROFOLATE REDUCTASE DEFICIENCY AFFECTING PREGNANCY IN THIRD TRIMESTER (HCC): ICD-10-CM

## 2023-08-22 PROCEDURE — 99999 PR OFFICE/OUTPT VISIT,PROCEDURE ONLY: CPT | Performed by: OBSTETRICS & GYNECOLOGY

## 2023-08-22 PROCEDURE — 76815 OB US LIMITED FETUS(S): CPT | Performed by: OBSTETRICS & GYNECOLOGY

## 2023-08-22 PROCEDURE — 76819 FETAL BIOPHYS PROFIL W/O NST: CPT | Performed by: OBSTETRICS & GYNECOLOGY

## 2023-08-22 PROCEDURE — 76820 UMBILICAL ARTERY ECHO: CPT | Performed by: OBSTETRICS & GYNECOLOGY

## 2023-08-23 ENCOUNTER — HOSPITAL ENCOUNTER (OUTPATIENT)
Dept: PHYSICAL THERAPY | Facility: CLINIC | Age: 33
Setting detail: THERAPIES SERIES
Discharge: HOME | End: 2023-08-23
Payer: COMMERCIAL

## 2023-08-23 PROCEDURE — 97110 THERAPEUTIC EXERCISES: CPT

## 2023-08-23 PROCEDURE — 97140 MANUAL THERAPY 1/> REGIONS: CPT

## 2023-08-24 ENCOUNTER — ROUTINE PRENATAL (OUTPATIENT)
Dept: OBGYN CLINIC | Age: 33
End: 2023-08-24

## 2023-08-24 VITALS
WEIGHT: 178 LBS | HEART RATE: 88 BPM | DIASTOLIC BLOOD PRESSURE: 75 MMHG | SYSTOLIC BLOOD PRESSURE: 111 MMHG | BODY MASS INDEX: 28.73 KG/M2

## 2023-08-24 DIAGNOSIS — Z3A.37 37 WEEKS GESTATION OF PREGNANCY: ICD-10-CM

## 2023-08-24 DIAGNOSIS — O09.891 MATERNAL EXPOSURE TO ALCOHOL IN FIRST TRIMESTER: ICD-10-CM

## 2023-08-24 DIAGNOSIS — O09.93 HIGH-RISK PREGNANCY IN THIRD TRIMESTER: Primary | ICD-10-CM

## 2023-08-24 DIAGNOSIS — O34.40 HISTORY OF LOOP ELECTROSURGICAL EXCISION PROCEDURE (LEEP) OF CERVIX AFFECTING PREGNANCY, ANTEPARTUM: ICD-10-CM

## 2023-08-24 DIAGNOSIS — Z15.89 MTHFR GENE MUTATION: ICD-10-CM

## 2023-08-24 DIAGNOSIS — Z98.890 HISTORY OF LOOP ELECTROSURGICAL EXCISION PROCEDURE (LEEP) OF CERVIX AFFECTING PREGNANCY, ANTEPARTUM: ICD-10-CM

## 2023-08-24 DIAGNOSIS — O09.521 MULTIGRAVIDA OF ADVANCED MATERNAL AGE IN FIRST TRIMESTER: ICD-10-CM

## 2023-08-24 DIAGNOSIS — J45.909 ASTHMA, UNSPECIFIED ASTHMA SEVERITY, UNSPECIFIED WHETHER COMPLICATED, UNSPECIFIED WHETHER PERSISTENT: ICD-10-CM

## 2023-08-24 DIAGNOSIS — O43.109 PLACENTAL ABNORMALITY, ANTEPARTUM: ICD-10-CM

## 2023-08-24 DIAGNOSIS — I82.A11 ACUTE DEEP VEIN THROMBOSIS (DVT) OF AXILLARY VEIN OF RIGHT UPPER EXTREMITY (HCC): ICD-10-CM

## 2023-08-29 ENCOUNTER — ROUTINE PRENATAL (OUTPATIENT)
Dept: OBGYN CLINIC | Age: 33
End: 2023-08-29
Payer: COMMERCIAL

## 2023-08-29 ENCOUNTER — ROUTINE PRENATAL (OUTPATIENT)
Dept: PERINATAL CARE | Age: 33
End: 2023-08-29
Payer: COMMERCIAL

## 2023-08-29 VITALS
HEIGHT: 66 IN | RESPIRATION RATE: 16 BRPM | WEIGHT: 172.4 LBS | TEMPERATURE: 97.8 F | HEART RATE: 88 BPM | SYSTOLIC BLOOD PRESSURE: 117 MMHG | DIASTOLIC BLOOD PRESSURE: 79 MMHG | BODY MASS INDEX: 27.71 KG/M2

## 2023-08-29 VITALS
BODY MASS INDEX: 27.92 KG/M2 | WEIGHT: 173 LBS | HEART RATE: 80 BPM | DIASTOLIC BLOOD PRESSURE: 65 MMHG | SYSTOLIC BLOOD PRESSURE: 107 MMHG

## 2023-08-29 DIAGNOSIS — Z98.890 HISTORY OF LOOP ELECTROSURGICAL EXCISION PROCEDURE (LEEP) OF CERVIX AFFECTING PREGNANCY, ANTEPARTUM: ICD-10-CM

## 2023-08-29 DIAGNOSIS — O99.283 METHYLENETETRAHYDROFOLATE REDUCTASE DEFICIENCY AFFECTING PREGNANCY IN THIRD TRIMESTER (HCC): ICD-10-CM

## 2023-08-29 DIAGNOSIS — O09.521 MULTIGRAVIDA OF ADVANCED MATERNAL AGE IN FIRST TRIMESTER: ICD-10-CM

## 2023-08-29 DIAGNOSIS — Z3A.38 38 WEEKS GESTATION OF PREGNANCY: ICD-10-CM

## 2023-08-29 DIAGNOSIS — E72.12 METHYLENETETRAHYDROFOLATE REDUCTASE DEFICIENCY AFFECTING PREGNANCY IN THIRD TRIMESTER (HCC): ICD-10-CM

## 2023-08-29 DIAGNOSIS — O34.40 HISTORY OF LOOP ELECTROSURGICAL EXCISION PROCEDURE (LEEP) OF CERVIX AFFECTING PREGNANCY, ANTEPARTUM: ICD-10-CM

## 2023-08-29 DIAGNOSIS — O09.93 HIGH-RISK PREGNANCY IN THIRD TRIMESTER: Primary | ICD-10-CM

## 2023-08-29 DIAGNOSIS — J45.909 ASTHMA, UNSPECIFIED ASTHMA SEVERITY, UNSPECIFIED WHETHER COMPLICATED, UNSPECIFIED WHETHER PERSISTENT: ICD-10-CM

## 2023-08-29 DIAGNOSIS — I82.A11 ACUTE DEEP VEIN THROMBOSIS (DVT) OF AXILLARY VEIN OF RIGHT UPPER EXTREMITY (HCC): ICD-10-CM

## 2023-08-29 DIAGNOSIS — O43.109 PLACENTAL ABNORMALITY, ANTEPARTUM: ICD-10-CM

## 2023-08-29 DIAGNOSIS — O09.523 MULTIGRAVIDA OF ADVANCED MATERNAL AGE IN THIRD TRIMESTER: ICD-10-CM

## 2023-08-29 DIAGNOSIS — Z15.89 MTHFR GENE MUTATION: ICD-10-CM

## 2023-08-29 DIAGNOSIS — O43.193 PLACENTA SUCCENTURIATA IN THIRD TRIMESTER: Primary | ICD-10-CM

## 2023-08-29 DIAGNOSIS — O99.513 ASTHMA AFFECTING PREGNANCY IN THIRD TRIMESTER: ICD-10-CM

## 2023-08-29 DIAGNOSIS — J45.909 ASTHMA AFFECTING PREGNANCY IN THIRD TRIMESTER: ICD-10-CM

## 2023-08-29 DIAGNOSIS — O09.891 MATERNAL EXPOSURE TO ALCOHOL IN FIRST TRIMESTER: ICD-10-CM

## 2023-08-29 PROCEDURE — 76819 FETAL BIOPHYS PROFIL W/O NST: CPT | Performed by: OBSTETRICS & GYNECOLOGY

## 2023-08-29 PROCEDURE — 76815 OB US LIMITED FETUS(S): CPT | Performed by: OBSTETRICS & GYNECOLOGY

## 2023-08-29 PROCEDURE — G8427 DOCREV CUR MEDS BY ELIG CLIN: HCPCS | Performed by: OBSTETRICS & GYNECOLOGY

## 2023-08-29 PROCEDURE — G8419 CALC BMI OUT NRM PARAM NOF/U: HCPCS | Performed by: OBSTETRICS & GYNECOLOGY

## 2023-08-29 PROCEDURE — 1036F TOBACCO NON-USER: CPT | Performed by: OBSTETRICS & GYNECOLOGY

## 2023-08-29 PROCEDURE — 76820 UMBILICAL ARTERY ECHO: CPT | Performed by: OBSTETRICS & GYNECOLOGY

## 2023-08-29 PROCEDURE — 99214 OFFICE O/P EST MOD 30 MIN: CPT | Performed by: OBSTETRICS & GYNECOLOGY

## 2023-08-29 PROCEDURE — 99999 PR OFFICE/OUTPT VISIT,PROCEDURE ONLY: CPT | Performed by: OBSTETRICS & GYNECOLOGY

## 2023-08-29 PROCEDURE — 59025 FETAL NON-STRESS TEST: CPT | Performed by: OBSTETRICS & GYNECOLOGY

## 2023-08-30 ENCOUNTER — HOSPITAL ENCOUNTER (OUTPATIENT)
Dept: PHYSICAL THERAPY | Facility: CLINIC | Age: 33
Setting detail: THERAPIES SERIES
Discharge: HOME | End: 2023-08-30
Payer: COMMERCIAL

## 2023-08-30 PROCEDURE — 97110 THERAPEUTIC EXERCISES: CPT

## 2023-08-30 NOTE — FLOWSHEET NOTE
[] [address]  [address] [facility].  [phone]  F: [phone] [] 204 [facility]  [address]   [address]  P: [phone]  F: [phone] [x] [address]  151 [facility]  P: [phone]  F: [phone] [] [place]: [phone]  F: [phone] [] 224 [facility]  [address] [facility]   [address]   P: [phone]  F: [phone]  [] [address] [facility].   P: [phone]  F: [phone] [] 205 [facility]  [address]  P: [phone]  F: [phone] [] 224 [facility]  [address] [facility]  [place]: [phone]  F: [phone] [] [address]  [place]: [phone]  F: [phone]      Physical Therapy Daily Treatment Note    Date:  2023  Patient Name:  Chanda Lutz    :  1990  MRN: [id number]  Physician: Jackson Wan DO                              Insurance: 1. [employer], auth after eval, ded 3000/met, oop max 6000/4232.94 rem, $60 copay  2. [place] visits  auth after , no pt liab    Approved 12 visits plus eval 8.15.23-9..23 auth # [id number]    Medical Diagnosis:   N94.9 (ICD-10-CM) - Round ligament pain   M62.89 (ICD-10-CM) - Pelvic floor dysfunction in female                             Rehab Codes: M25.551, N81.84, R27.8, R29.3, M62.81  Onset Date: 2023                                  Next 's appt: 23       Visit# / total visits: 3/12;      Cancels/No Shows:

## 2023-09-05 ENCOUNTER — ROUTINE PRENATAL (OUTPATIENT)
Dept: OBGYN CLINIC | Age: 33
End: 2023-09-05
Payer: COMMERCIAL

## 2023-09-05 ENCOUNTER — ROUTINE PRENATAL (OUTPATIENT)
Dept: PERINATAL CARE | Age: 33
End: 2023-09-05
Payer: COMMERCIAL

## 2023-09-05 VITALS
BODY MASS INDEX: 27.76 KG/M2 | WEIGHT: 172 LBS | DIASTOLIC BLOOD PRESSURE: 71 MMHG | SYSTOLIC BLOOD PRESSURE: 110 MMHG | HEART RATE: 84 BPM

## 2023-09-05 VITALS
TEMPERATURE: 97.9 F | WEIGHT: 173 LBS | BODY MASS INDEX: 27.8 KG/M2 | HEART RATE: 90 BPM | RESPIRATION RATE: 16 BRPM | DIASTOLIC BLOOD PRESSURE: 75 MMHG | SYSTOLIC BLOOD PRESSURE: 110 MMHG | HEIGHT: 66 IN

## 2023-09-05 DIAGNOSIS — O43.193 PLACENTA SUCCENTURIATA IN THIRD TRIMESTER: ICD-10-CM

## 2023-09-05 DIAGNOSIS — Z98.890 HISTORY OF LOOP ELECTROSURGICAL EXCISION PROCEDURE (LEEP) OF CERVIX AFFECTING PREGNANCY, ANTEPARTUM: ICD-10-CM

## 2023-09-05 DIAGNOSIS — Z36.4 ULTRASOUND FOR ANTENATAL SCREENING FOR FETAL GROWTH RESTRICTION: ICD-10-CM

## 2023-09-05 DIAGNOSIS — E72.12 METHYLENETETRAHYDROFOLATE REDUCTASE DEFICIENCY AFFECTING PREGNANCY IN THIRD TRIMESTER (HCC): ICD-10-CM

## 2023-09-05 DIAGNOSIS — J45.909 ASTHMA AFFECTING PREGNANCY IN THIRD TRIMESTER: ICD-10-CM

## 2023-09-05 DIAGNOSIS — O09.891 MATERNAL EXPOSURE TO ALCOHOL IN FIRST TRIMESTER: ICD-10-CM

## 2023-09-05 DIAGNOSIS — O34.40 HISTORY OF LOOP ELECTROSURGICAL EXCISION PROCEDURE (LEEP) OF CERVIX AFFECTING PREGNANCY, ANTEPARTUM: ICD-10-CM

## 2023-09-05 DIAGNOSIS — Z3A.39 39 WEEKS GESTATION OF PREGNANCY: ICD-10-CM

## 2023-09-05 DIAGNOSIS — Z3A.39 39 WEEKS GESTATION OF PREGNANCY: Primary | ICD-10-CM

## 2023-09-05 DIAGNOSIS — O09.523 MULTIGRAVIDA OF ADVANCED MATERNAL AGE IN THIRD TRIMESTER: ICD-10-CM

## 2023-09-05 DIAGNOSIS — I82.A11 ACUTE DEEP VEIN THROMBOSIS (DVT) OF AXILLARY VEIN OF RIGHT UPPER EXTREMITY (HCC): ICD-10-CM

## 2023-09-05 DIAGNOSIS — J45.909 ASTHMA, UNSPECIFIED ASTHMA SEVERITY, UNSPECIFIED WHETHER COMPLICATED, UNSPECIFIED WHETHER PERSISTENT: ICD-10-CM

## 2023-09-05 DIAGNOSIS — O43.109 PLACENTAL ABNORMALITY, ANTEPARTUM: ICD-10-CM

## 2023-09-05 DIAGNOSIS — Z15.89 MTHFR GENE MUTATION: ICD-10-CM

## 2023-09-05 DIAGNOSIS — O99.513 ASTHMA AFFECTING PREGNANCY IN THIRD TRIMESTER: ICD-10-CM

## 2023-09-05 DIAGNOSIS — O41.00X0 OLIGOHYDRAMNIOS, ANTEPARTUM, SINGLE OR UNSPECIFIED FETUS: Primary | ICD-10-CM

## 2023-09-05 DIAGNOSIS — O09.521 MULTIGRAVIDA OF ADVANCED MATERNAL AGE IN FIRST TRIMESTER: ICD-10-CM

## 2023-09-05 DIAGNOSIS — O99.283 METHYLENETETRAHYDROFOLATE REDUCTASE DEFICIENCY AFFECTING PREGNANCY IN THIRD TRIMESTER (HCC): ICD-10-CM

## 2023-09-05 PROBLEM — O41.8X90 SEPARATION OF CHORION AND AMNION MEMBRANES, ANTEPARTUM: Status: ACTIVE | Noted: 2023-09-05

## 2023-09-05 PROCEDURE — 76816 OB US FOLLOW-UP PER FETUS: CPT | Performed by: OBSTETRICS & GYNECOLOGY

## 2023-09-05 PROCEDURE — 99999 PR OFFICE/OUTPT VISIT,PROCEDURE ONLY: CPT | Performed by: OBSTETRICS & GYNECOLOGY

## 2023-09-05 PROCEDURE — 76820 UMBILICAL ARTERY ECHO: CPT | Performed by: OBSTETRICS & GYNECOLOGY

## 2023-09-05 PROCEDURE — 1036F TOBACCO NON-USER: CPT | Performed by: OBSTETRICS & GYNECOLOGY

## 2023-09-05 PROCEDURE — G8427 DOCREV CUR MEDS BY ELIG CLIN: HCPCS | Performed by: OBSTETRICS & GYNECOLOGY

## 2023-09-05 PROCEDURE — G8419 CALC BMI OUT NRM PARAM NOF/U: HCPCS | Performed by: OBSTETRICS & GYNECOLOGY

## 2023-09-05 PROCEDURE — 76819 FETAL BIOPHYS PROFIL W/O NST: CPT | Performed by: OBSTETRICS & GYNECOLOGY

## 2023-09-05 PROCEDURE — 76821 MIDDLE CEREBRAL ARTERY ECHO: CPT | Performed by: OBSTETRICS & GYNECOLOGY

## 2023-09-05 PROCEDURE — 99214 OFFICE O/P EST MOD 30 MIN: CPT | Performed by: OBSTETRICS & GYNECOLOGY

## 2023-09-05 PROCEDURE — 59025 FETAL NON-STRESS TEST: CPT | Performed by: OBSTETRICS & GYNECOLOGY

## 2023-09-05 NOTE — PROGRESS NOTES
I would advise delivery today at 39 4/7 weeks of gestation (secondary to oligohydramnios and given the maternal/fetal medical/obstetrical complications of pregnancy), as per The [address] [place] [address] and Gynecologists and the Society for Maternal-Fetal Medicine guidelines in the ACOG Committee Opinion Number 070-718-494  (\"Medically Indicated Late- and Early-Term Deliveries\", Obstetrics & Gynecology, Volume 138, NO.1, 2021. Patient adamantly declines Labor and Delivery management and advised plan of care for delivery today (patient made aware of potential increased risk of stillbirth given the medical/obstetrical complications of pregnancy) and has left the [facility] office against medical advice (AMA)-please refer to the completed AMA form. Labor and delivery and covering obstetrics provider (Dr. Nuha Nava) notified of the above findings and plan of care appropriately coordinated. Please refer to [address] [facility] resident progress note in [facility].

## 2023-09-05 NOTE — PROGRESS NOTES
Obstetric/Gynecology Maternal Fetal Medicine Resident Note    The patient was seen in the office today for routine ultrasound. New findings of oligohydramnios and amniotic-chorionic separation were noted today. The patient was counseled on the recommendation to be admitted to labor and delivery for induction of labor at this time, as she is 40 1/5 weeks gestational age. The patient has decided to refuse admission at this time, and elected to sign out [address] [place] [address]. She was counseled on all risk associated with leaving AMA at this time including risk of stillbirth. The patient voiced understanding at this time and elected to proceed. She is scheduled for an appointment in her primary OB/GYN office today 9/5/23 at 0930. This patient's plan of care was discussed with Dr. Steven Frey, who is in agreement.     Flaquito Valle MD  OBGYN Resident, PGY-2  [facility]  9/5/2023, 8:32 AM

## 2023-09-06 ENCOUNTER — HOSPITAL ENCOUNTER (INPATIENT)
Age: 33
LOS: 3 days | Discharge: HOME | DRG: 560 | End: 2023-09-09
Attending: OBSTETRICS & GYNECOLOGY | Admitting: OBSTETRICS & GYNECOLOGY
Payer: COMMERCIAL

## 2023-09-06 ENCOUNTER — APPOINTMENT (OUTPATIENT)
Dept: LABOR AND DELIVERY | Age: 33
DRG: 560 | End: 2023-09-06
Payer: COMMERCIAL

## 2023-09-06 ENCOUNTER — HOSPITAL ENCOUNTER (OUTPATIENT)
Dept: PHYSICAL THERAPY | Facility: CLINIC | Age: 33
Setting detail: THERAPIES SERIES
Discharge: HOME | End: 2023-09-06

## 2023-09-06 DIAGNOSIS — N94.89 LABIAL PAIN: ICD-10-CM

## 2023-09-06 DIAGNOSIS — N90.89 HEMATOMA OF LABIA MAJORA: Primary | ICD-10-CM

## 2023-09-06 PROBLEM — Z3A.39 39 WEEKS GESTATION OF PREGNANCY: Status: ACTIVE | Noted: 2023-09-06

## 2023-09-06 LAB
ABO + RH BLD: NORMAL
AMPHET UR QL SCN: NEGATIVE
ARM BAND NUMBER: NORMAL
BARBITURATES UR QL SCN: NEGATIVE
BASOPHILS # BLD: 0.04 K/UL (ref 0–0.2)
BASOPHILS NFR BLD: 0 % (ref 0–2)
BENZODIAZ UR QL: NEGATIVE
BLOOD BANK SAMPLE EXPIRATION: NORMAL
BLOOD GROUP ANTIBODIES SERPL: NEGATIVE
CANNABINOIDS UR QL SCN: NEGATIVE
COCAINE UR QL SCN: NEGATIVE
EOSINOPHIL # BLD: 0.11 K/UL (ref 0–0.44)
EOSINOPHILS RELATIVE PERCENT: 1 % (ref 1–4)
ERYTHROCYTE [DISTWIDTH] IN BLOOD BY AUTOMATED COUNT: 13.3 % (ref 11.8–14.4)
FENTANYL UR QL: NEGATIVE
HCT VFR BLD AUTO: 44.3 % (ref 36.3–47.1)
HGB BLD-MCNC: 15.1 G/DL (ref 11.9–15.1)
IMM GRANULOCYTES # BLD AUTO: 0.23 K/UL (ref 0–0.3)
IMM GRANULOCYTES NFR BLD: 2 %
LYMPHOCYTES NFR BLD: 1.56 K/UL (ref 1.1–3.7)
LYMPHOCYTES RELATIVE PERCENT: 15 % (ref 24–43)
MCH RBC QN AUTO: 31.8 PG (ref 25.2–33.5)
MCHC RBC AUTO-ENTMCNC: 34.1 G/DL (ref 28.4–34.8)
MCV RBC AUTO: 93.3 FL (ref 82.6–102.9)
METHADONE UR QL: NEGATIVE
MONOCYTES NFR BLD: 0.79 K/UL (ref 0.1–1.2)
MONOCYTES NFR BLD: 7 % (ref 3–12)
NEUTROPHILS NFR BLD: 75 % (ref 36–65)
NEUTS SEG NFR BLD: 8.04 K/UL (ref 1.5–8.1)
NRBC BLD-RTO: 0 PER 100 WBC
OPIATES UR QL SCN: NEGATIVE
OXYCODONE UR QL SCN: NEGATIVE
PCP UR QL SCN: NEGATIVE
PLATELET # BLD AUTO: 186 K/UL (ref 138–453)
PMV BLD AUTO: 11 FL (ref 8.1–13.5)
RBC # BLD AUTO: 4.75 M/UL (ref 3.95–5.11)
T PALLIDUM AB SER QL IA: NONREACTIVE
TEST INFORMATION: NORMAL
WBC OTHER # BLD: 10.8 K/UL (ref 3.5–11.3)

## 2023-09-06 PROCEDURE — 86901 BLOOD TYPING SEROLOGIC RH(D): CPT

## 2023-09-06 PROCEDURE — 6370000000 HC RX 637 (ALT 250 FOR IP)

## 2023-09-06 PROCEDURE — 80307 DRUG TEST PRSMV CHEM ANLYZR: CPT

## 2023-09-06 PROCEDURE — 1220000000 HC SEMI PRIVATE OB R&B

## 2023-09-06 PROCEDURE — 86850 RBC ANTIBODY SCREEN: CPT

## 2023-09-06 PROCEDURE — 85025 COMPLETE CBC W/AUTO DIFF WBC: CPT

## 2023-09-06 PROCEDURE — 3E0P7VZ INTRODUCTION OF HORMONE INTO FEMALE REPRODUCTIVE, VIA NATURAL OR ARTIFICIAL OPENING: ICD-10-PCS | Performed by: OBSTETRICS & GYNECOLOGY

## 2023-09-06 PROCEDURE — 2580000003 HC RX 258

## 2023-09-06 PROCEDURE — 86900 BLOOD TYPING SEROLOGIC ABO: CPT

## 2023-09-06 PROCEDURE — 86780 TREPONEMA PALLIDUM: CPT

## 2023-09-06 RX ORDER — SODIUM CHLORIDE 0.9 % (FLUSH) 0.9 %
5-40 SYRINGE (ML) INJECTION EVERY 12 HOURS SCHEDULED
Status: DISCONTINUED | OUTPATIENT
Start: 2023-09-06 | End: 2023-09-07

## 2023-09-06 RX ORDER — DIPHENHYDRAMINE HCL 25 MG
25 TABLET ORAL EVERY 4 HOURS PRN
Status: DISCONTINUED | OUTPATIENT
Start: 2023-09-06 | End: 2023-09-07

## 2023-09-06 RX ORDER — ONDANSETRON 2 MG/ML
4 INJECTION INTRAMUSCULAR; INTRAVENOUS EVERY 6 HOURS PRN
Status: DISCONTINUED | OUTPATIENT
Start: 2023-09-06 | End: 2023-09-07

## 2023-09-06 RX ORDER — SODIUM CHLORIDE, SODIUM LACTATE, POTASSIUM CHLORIDE, AND CALCIUM CHLORIDE .6; .31; .03; .02 G/100ML; G/100ML; G/100ML; G/100ML
500 INJECTION, SOLUTION INTRAVENOUS PRN
Status: DISCONTINUED | OUTPATIENT
Start: 2023-09-06 | End: 2023-09-07

## 2023-09-06 RX ORDER — SODIUM CHLORIDE 9 MG/ML
INJECTION, SOLUTION INTRAVENOUS PRN
Status: DISCONTINUED | OUTPATIENT
Start: 2023-09-06 | End: 2023-09-07

## 2023-09-06 RX ORDER — SODIUM CHLORIDE 0.9 % (FLUSH) 0.9 %
5-40 SYRINGE (ML) INJECTION PRN
Status: DISCONTINUED | OUTPATIENT
Start: 2023-09-06 | End: 2023-09-07

## 2023-09-06 RX ORDER — SODIUM CHLORIDE, SODIUM LACTATE, POTASSIUM CHLORIDE, CALCIUM CHLORIDE 600; 310; 30; 20 MG/100ML; MG/100ML; MG/100ML; MG/100ML
INJECTION, SOLUTION INTRAVENOUS CONTINUOUS
Status: DISCONTINUED | OUTPATIENT
Start: 2023-09-06 | End: 2023-09-07

## 2023-09-06 RX ORDER — ACETAMINOPHEN 500 MG
1000 TABLET ORAL EVERY 6 HOURS PRN
Status: DISCONTINUED | OUTPATIENT
Start: 2023-09-06 | End: 2023-09-07

## 2023-09-06 RX ORDER — SODIUM CHLORIDE, SODIUM LACTATE, POTASSIUM CHLORIDE, AND CALCIUM CHLORIDE .6; .31; .03; .02 G/100ML; G/100ML; G/100ML; G/100ML
1000 INJECTION, SOLUTION INTRAVENOUS PRN
Status: DISCONTINUED | OUTPATIENT
Start: 2023-09-06 | End: 2023-09-07

## 2023-09-06 RX ADMIN — SODIUM CHLORIDE, POTASSIUM CHLORIDE, SODIUM LACTATE AND CALCIUM CHLORIDE: 600; 310; 30; 20 INJECTION, SOLUTION INTRAVENOUS at 19:39

## 2023-09-06 RX ADMIN — DINOPROSTONE 10 MG: 10 INSERT VAGINAL at 21:09

## 2023-09-06 NOTE — FLOWSHEET NOTE
[] [address]  [address] [facility].    [phone]  F: [phone]   [] [address]  [address]   [address]  P: [phone]  F: [phone]  [x] [address]  Therapy  151 [facility]  P: [phone]  F: [phone] [] [facility]  P: [phone]  F: [phone]  [] 224 [facility]  [address] [facility]   [address]   [place]: [phone]  F: [phone]   [] 97 [facility]  [address]  [place]: [phone]   F: [phone]     Physical Therapy Cancel/No Show note    Date: 2023  Patient: Dorothy Taylor  : 1990  MRN: [id number]    Cancels/No Shows to date:     For today's appointment patient:    [x]  Cancelled    [] Rescheduled appointment    [] No-show     Reason given by patient:    []  Patient ill    []  Conflicting appointment    [] No transportation      [] Conflict with work    [] No reason given    [] Weather related    [] COVID-19    [x] Other:      Comments:  Patient to be induced for labor today       [] Next appointment was confirmed    Electronically signed by: Jodee Christian PT

## 2023-09-06 NOTE — FLOWSHEET NOTE
Pt to L&D for scheduled IOL. Pt denies ctx and LOF. Pt states small vaginal bleeding that started yesterday. +FM.

## 2023-09-06 NOTE — H&P
Chorionic villi are tiny parts of the placenta; therefore they have the same genes as the baby. This test is 98% accurate, but can carry a slightly higher risk of miscarriage than amniocentesis, since the procedure is done in early pregnancy. Amniocentesis is a procedure where a sample of fluid is removed from the amniotic sac for analysis and evaluation. During this procedure, fluid is removed by placing a long needle through the abdominal wall into the amniotic sac. The amniocentesis needle is typically guided into the sac with the help of ultrasound imaging. Once the needle is in the sac, a syringe is used to withdraw the clear ebenezer-colored amniotic fluid, which looks a bit like urine. NIPT, utilizes the maternal blood to test for fetal cells. These fetal cells are then karyotyped for genetic evaluation. All of these tests, CVS, NIPT and amniocentesis, let couples know if the fetus will have genetic abnormalities, and will help them make informed decisions regarding their pregnancy. Karyotyping by either CVS, NIPT or Amniocentesis is the ONLY way to confirm the genetic chromosomal structure regarding trisomy; Down's Syndrome, Edward's or Pateau's. [facility] was reviewed. If NIPT is positive then a confirmatory amniocentesis would be recommended to confirm the diagnosis. [facility] guidelines were reviewed. History of LEEP  02/21/2023     Priority: Medium     . TV CL every 2 weeks between 16 and 24 gestational weeks. Fetal exposure to alcohol in first trimester 02/21/2023     Priority: Medium     Fetal echo at 24 weeks        Hx RUE DVT and pulmonary embolism 2019 04/28/2019     Priority: Medium     2019 in [facility]. Xarelto x 6mos.          Rh+/RI/GBSneg 09/06/2023    Oligohydramnios 09/05/2023     Noted on 9/5/23 MF US  CHANDLER reported to be 3.36  Patient declining induction at time of ultrasound        Separation of chorion and amnion membranes, antepartum 09/05/2023     Noted on [facility] US 9/5/23  Patient

## 2023-09-06 NOTE — DISCHARGE SUMMARY
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 female  at 38w8d admitted for IOL 2/2 Oligohydramnios (CHANDLER 3.36); received Cervidil x1, SROM (clear), Pitocin, epidural.     She delivered by spontaneous vaginal a Live Born infant on 23. Information for the patient's :  Navin Paezter [[id number]]   male   Birth Weight: 8 lb 0.6 oz (3.645 kg)     Apgars: 8 at 1 minute and 9 at 5 minutes. Postpartum course:     PPD#0: Pt noted to have R labial hematoma, patient received morphine and roxicodone PRN. PPD#1: Hgb 11.7. PPD#: Hgb 10.9.     Course of patient: complicated by right labial hematoma     Discharge to: Home    Readmission planned: no     Indication for 6 week PP 2 hour GTT?: no     Recommendations on Discharge:     Medications:      Medication List        START taking these medications

## 2023-09-07 ENCOUNTER — ANESTHESIA (OUTPATIENT)
Dept: LABOR AND DELIVERY | Age: 33
DRG: 560 | End: 2023-09-07
Payer: COMMERCIAL

## 2023-09-07 ENCOUNTER — ANESTHESIA EVENT (OUTPATIENT)
Dept: LABOR AND DELIVERY | Age: 33
DRG: 560 | End: 2023-09-07
Payer: COMMERCIAL

## 2023-09-07 PROCEDURE — 6360000002 HC RX W HCPCS: Performed by: ANESTHESIOLOGY

## 2023-09-07 PROCEDURE — 6360000002 HC RX W HCPCS

## 2023-09-07 PROCEDURE — 7200000001 HC VAGINAL DELIVERY

## 2023-09-07 PROCEDURE — 59409 OBSTETRICAL CARE: CPT | Performed by: OBSTETRICS & GYNECOLOGY

## 2023-09-07 PROCEDURE — 0HQ9XZZ REPAIR PERINEUM SKIN, EXTERNAL APPROACH: ICD-10-PCS | Performed by: OBSTETRICS & GYNECOLOGY

## 2023-09-07 PROCEDURE — 88307 TISSUE EXAM BY PATHOLOGIST: CPT

## 2023-09-07 PROCEDURE — 1220000000 HC SEMI PRIVATE OB R&B

## 2023-09-07 PROCEDURE — 10907ZC DRAINAGE OF AMNIOTIC FLUID, THERAPEUTIC FROM PRODUCTS OF CONCEPTION, VIA NATURAL OR ARTIFICIAL OPENING: ICD-10-PCS | Performed by: OBSTETRICS & GYNECOLOGY

## 2023-09-07 PROCEDURE — 6370000000 HC RX 637 (ALT 250 FOR IP)

## 2023-09-07 PROCEDURE — 2500000003 HC RX 250 WO HCPCS: Performed by: NURSE ANESTHETIST, CERTIFIED REGISTERED

## 2023-09-07 PROCEDURE — 51701 INSERT BLADDER CATHETER: CPT

## 2023-09-07 PROCEDURE — 3700000025 EPIDURAL BLOCK: Performed by: ANESTHESIOLOGY

## 2023-09-07 PROCEDURE — 2580000003 HC RX 258

## 2023-09-07 RX ORDER — SODIUM CHLORIDE 0.9 % (FLUSH) 0.9 %
5-40 SYRINGE (ML) INJECTION PRN
Status: DISCONTINUED | OUTPATIENT
Start: 2023-09-07 | End: 2023-09-09 | Stop reason: HOSPADM

## 2023-09-07 RX ORDER — SIMETHICONE 80 MG
80 TABLET,CHEWABLE ORAL EVERY 6 HOURS PRN
Status: DISCONTINUED | OUTPATIENT
Start: 2023-09-07 | End: 2023-09-09 | Stop reason: HOSPADM

## 2023-09-07 RX ORDER — SENNA AND DOCUSATE SODIUM 50; 8.6 MG/1; MG/1
1 TABLET, FILM COATED ORAL 2 TIMES DAILY
Qty: 60 TABLET | Refills: 0 | Status: SHIPPED | OUTPATIENT
Start: 2023-09-07

## 2023-09-07 RX ORDER — LIDOCAINE HYDROCHLORIDE AND EPINEPHRINE 15; 5 MG/ML; UG/ML
INJECTION, SOLUTION EPIDURAL PRN
Status: DISCONTINUED | OUTPATIENT
Start: 2023-09-07 | End: 2023-09-07 | Stop reason: SDUPTHER

## 2023-09-07 RX ORDER — ENOXAPARIN SODIUM 100 MG/ML
40 INJECTION SUBCUTANEOUS DAILY
Qty: 16.8 ML | Refills: 0 | Status: SHIPPED | OUTPATIENT
Start: 2023-09-07 | End: 2023-10-19

## 2023-09-07 RX ORDER — DOCUSATE SODIUM 100 MG/1
100 CAPSULE, LIQUID FILLED ORAL 2 TIMES DAILY
Status: DISCONTINUED | OUTPATIENT
Start: 2023-09-07 | End: 2023-09-09 | Stop reason: HOSPADM

## 2023-09-07 RX ORDER — ACETAMINOPHEN 500 MG
1000 TABLET ORAL EVERY 6 HOURS PRN
Status: DISCONTINUED | OUTPATIENT
Start: 2023-09-07 | End: 2023-09-09 | Stop reason: HOSPADM

## 2023-09-07 RX ORDER — ROPIVACAINE HYDROCHLORIDE 2 MG/ML
INJECTION, SOLUTION EPIDURAL; INFILTRATION; PERINEURAL
Status: COMPLETED
Start: 2023-09-07 | End: 2023-09-07

## 2023-09-07 RX ORDER — MORPHINE SULFATE 4 MG/ML
4 INJECTION, SOLUTION INTRAMUSCULAR; INTRAVENOUS
Status: DISCONTINUED | OUTPATIENT
Start: 2023-09-07 | End: 2023-09-09 | Stop reason: HOSPADM

## 2023-09-07 RX ORDER — SODIUM CHLORIDE 9 MG/ML
INJECTION, SOLUTION INTRAVENOUS PRN
Status: DISCONTINUED | OUTPATIENT
Start: 2023-09-07 | End: 2023-09-09 | Stop reason: HOSPADM

## 2023-09-07 RX ORDER — OXYCODONE HYDROCHLORIDE 5 MG/1
5 TABLET ORAL EVERY 4 HOURS PRN
Status: DISCONTINUED | OUTPATIENT
Start: 2023-09-07 | End: 2023-09-09 | Stop reason: HOSPADM

## 2023-09-07 RX ORDER — ACETAMINOPHEN 500 MG
1000 TABLET ORAL EVERY 6 HOURS PRN
Qty: 30 TABLET | Refills: 1 | Status: SHIPPED | OUTPATIENT
Start: 2023-09-07

## 2023-09-07 RX ORDER — ENOXAPARIN SODIUM 100 MG/ML
40 INJECTION SUBCUTANEOUS DAILY
Status: DISCONTINUED | OUTPATIENT
Start: 2023-09-08 | End: 2023-09-09 | Stop reason: HOSPADM

## 2023-09-07 RX ORDER — OXYCODONE HYDROCHLORIDE 5 MG/1
10 TABLET ORAL EVERY 4 HOURS PRN
Status: DISCONTINUED | OUTPATIENT
Start: 2023-09-07 | End: 2023-09-09 | Stop reason: HOSPADM

## 2023-09-07 RX ORDER — IBUPROFEN 600 MG/1
600 TABLET ORAL EVERY 6 HOURS PRN
Qty: 40 TABLET | Refills: 1 | Status: SHIPPED | OUTPATIENT
Start: 2023-09-07

## 2023-09-07 RX ORDER — ONDANSETRON 2 MG/ML
4 INJECTION INTRAMUSCULAR; INTRAVENOUS EVERY 6 HOURS PRN
Status: DISCONTINUED | OUTPATIENT
Start: 2023-09-07 | End: 2023-09-09 | Stop reason: HOSPADM

## 2023-09-07 RX ORDER — HYDROCORTISONE 25 MG/G
CREAM TOPICAL
Status: DISCONTINUED | OUTPATIENT
Start: 2023-09-07 | End: 2023-09-09 | Stop reason: HOSPADM

## 2023-09-07 RX ORDER — LANOLIN 72 %
OINTMENT (GRAM) TOPICAL PRN
Status: DISCONTINUED | OUTPATIENT
Start: 2023-09-07 | End: 2023-09-09 | Stop reason: HOSPADM

## 2023-09-07 RX ORDER — SODIUM CHLORIDE 0.9 % (FLUSH) 0.9 %
5-40 SYRINGE (ML) INJECTION EVERY 12 HOURS SCHEDULED
Status: DISCONTINUED | OUTPATIENT
Start: 2023-09-07 | End: 2023-09-09 | Stop reason: HOSPADM

## 2023-09-07 RX ORDER — IBUPROFEN 600 MG/1
600 TABLET ORAL EVERY 6 HOURS
Status: DISCONTINUED | OUTPATIENT
Start: 2023-09-07 | End: 2023-09-09 | Stop reason: HOSPADM

## 2023-09-07 RX ADMIN — SODIUM CHLORIDE, POTASSIUM CHLORIDE, SODIUM LACTATE AND CALCIUM CHLORIDE: 600; 310; 30; 20 INJECTION, SOLUTION INTRAVENOUS at 03:01

## 2023-09-07 RX ADMIN — DOCUSATE SODIUM 100 MG: 100 CAPSULE, LIQUID FILLED ORAL at 23:45

## 2023-09-07 RX ADMIN — IBUPROFEN 600 MG: 600 TABLET, FILM COATED ORAL at 19:59

## 2023-09-07 RX ADMIN — ONDANSETRON 4 MG: 2 INJECTION INTRAMUSCULAR; INTRAVENOUS at 21:25

## 2023-09-07 RX ADMIN — MORPHINE SULFATE 4 MG: 4 INJECTION, SOLUTION INTRAMUSCULAR; INTRAVENOUS at 21:36

## 2023-09-07 RX ADMIN — OXYCODONE HYDROCHLORIDE 10 MG: 5 TABLET ORAL at 23:45

## 2023-09-07 RX ADMIN — Medication 3 MILLI-UNITS/MIN: at 11:38

## 2023-09-07 RX ADMIN — LIDOCAINE HYDROCHLORIDE,EPINEPHRINE BITARTRATE 3 ML: 15; .005 INJECTION, SOLUTION EPIDURAL; INFILTRATION; INTRACAUDAL; PERINEURAL at 12:52

## 2023-09-07 RX ADMIN — Medication 166.7 ML: at 18:25

## 2023-09-07 RX ADMIN — ACETAMINOPHEN 1000 MG: 500 TABLET ORAL at 20:45

## 2023-09-07 RX ADMIN — ROPIVACAINE HYDROCHLORIDE 8 ML: 2 INJECTION, SOLUTION EPIDURAL; INFILTRATION at 13:05

## 2023-09-07 RX ADMIN — ROPIVACAINE HYDROCHLORIDE 10 ML/HR: 2 INJECTION, SOLUTION EPIDURAL; INFILTRATION at 13:06

## 2023-09-07 RX ADMIN — Medication 1 MILLI-UNITS/MIN: at 09:36

## 2023-09-07 RX ADMIN — LIDOCAINE HYDROCHLORIDE,EPINEPHRINE BITARTRATE 2 ML: 15; .005 INJECTION, SOLUTION EPIDURAL; INFILTRATION; INTRACAUDAL; PERINEURAL at 12:55

## 2023-09-07 ASSESSMENT — PAIN SCALES - GENERAL
PAINLEVEL_OUTOF10: 8
PAINLEVEL_OUTOF10: 7
PAINLEVEL_OUTOF10: 8
PAINLEVEL_OUTOF10: 8

## 2023-09-07 ASSESSMENT — PAIN DESCRIPTION - ORIENTATION
ORIENTATION_2: MID;LOWER
ORIENTATION: RIGHT

## 2023-09-07 ASSESSMENT — PAIN - FUNCTIONAL ASSESSMENT
PAIN_FUNCTIONAL_ASSESSMENT: ACTIVITIES ARE NOT PREVENTED
PAIN_FUNCTIONAL_ASSESSMENT_SITE2: ACTIVITIES ARE NOT PREVENTED

## 2023-09-07 ASSESSMENT — PAIN DESCRIPTION - LOCATION
LOCATION: VAGINA
LOCATION: VAGINA;OTHER (COMMENT)
LOCATION: VAGINA;BUTTOCKS
LOCATION_2: BACK
LOCATION: VAGINA

## 2023-09-07 ASSESSMENT — ENCOUNTER SYMPTOMS: SHORTNESS OF BREATH: 0

## 2023-09-07 ASSESSMENT — PAIN DESCRIPTION - DESCRIPTORS
DESCRIPTORS_2: BURNING
DESCRIPTORS: BURNING;DISCOMFORT

## 2023-09-07 ASSESSMENT — LIFESTYLE VARIABLES: SMOKING_STATUS: 0

## 2023-09-07 ASSESSMENT — PAIN DESCRIPTION - INTENSITY: RATING_2: 3

## 2023-09-07 NOTE — PROGRESS NOTES
Obstetric/Gynecology Resident Interval Note    Fetal Heart Monitor:  Baseline Heart Rate 130, moderate variability, present accelerations, absent decelerations  [place]: contractions, regular, every 4 minutes    Cervidil remains in place. Pt resting comfortably per RN. Continue to monitor closely.      Zelalem Ahmadi MD  OB/GYN Resident, PGY2  [place], [place]  5/31/2023, 3:19 PM

## 2023-09-07 NOTE — CARE COORDINATION
ANTEPARTUM NOTE    39 weeks gestation of pregnancy [Z3A.39]    Denis Fuentes was admitted to L&D on 9/6/23 for IOL for oligo @ 39w5d    OB GYN Provider: Dr. Tera Tong    Will meet with patient after delivery to verify name/address/phone/insurance and discuss discharge planning. Anticipate DC home 2 nights after vaginal delivery or 4 nights after C/S delivery as long as hemodynamically stable.

## 2023-09-07 NOTE — ANESTHESIA PROCEDURE NOTES
Epidural Block    Patient location during procedure: OB  Reason for block: labor epidural  Staffing  Performed: resident/CRNA   Resident/CRNA: MEAGAN Marrufo CRNA  Epidural  Patient position: sitting  Prep: Betadine  Patient monitoring: continuous pulse ox and frequent blood pressure checks  Approach: midline  Location: L4-5  Injection technique: KRIS saline  Guidance: paresthesia technique  Provider prep: mask and sterile gloves  Needle  Needle type: Tuohy   Needle gauge: 17 G  Needle length: 3.5 in  Needle insertion depth: 7 cm  Catheter type: side hole  Catheter size: 19 G  Catheter at skin depth: 12 cm  Test dose: negativeCatheter Secured: tegaderm and tape  Assessment  Sensory level: T10  Hemodynamics: stable  Attempts: 1  Outcomes: uncomplicated and patient tolerated procedure well  Preanesthetic Checklist  Completed: patient identified, IV checked, site marked, risks and benefits discussed, surgical/procedural consents, equipment checked, pre-op evaluation, timeout performed, anesthesia consent given, oxygen available, monitors applied/VS acknowledged, fire risk safety assessment completed and verbalized and blood product R/B/A discussed and consented

## 2023-09-07 NOTE — L&D DELIVERY NOTE
Rosy Tucker [[id number]]      Labor Events     Labor: No  Cervical Ripening Date/Time:      Cervical Ripening Type: Cervidil  Antibiotics Received during Labor: No  Rupture Date/Time:  23 04:05:00   Rupture Type: SROM  Fluid Color: Clear  Fluid Odor: None  Fluid Volume: Scant  Induction: Cervidil  Labor Complications: None              Anesthesia    Method: Epidural       Delivery Details      Delivery Date: 23 Delivery Time: 18:20:00   Delivery Type: Vaginal, Spontaneous               Presentation    Presentation: Vertex  Position: Left  _: Occiput  _: Anterior       Shoulder Dystocia    Shoulder Dystocia Present?: No       Assisted Delivery Details    Forceps Attempted?: No  Vacuum Extractor Attempted?: No                           Cord    Vessels: 3 Vessels  Complications: Nuchal Loose  Delayed Cord Clamping?: Yes  Cord Clamped Date/Time: 2023 18:21:00  Cord Blood Disposition: Lab  Gases Sent?: Yes              Placenta    Date/Time: 2023 18:25:00  Removal: Spontaneous  Appearance: Intact  Disposition: Pathology       Lacerations    Episiotomy: None  Perineal Lacerations: None  Other Lacerations: labial laceration  Labial Laceration: right Repaired?: Yes   Number of Repair Packets: 1       Vaginal Counts    Initial Count Personnel: [email or web address]  Initial Count Verified By: Obdulia Miller Sponge Count: Correct Intial Needles Count: Correct Intial Instruments Count: Correct   Final Sponges Count: Correct Final Needles  Count: Correct Final Instruments Count: Correct   Final Count Personnel: [email or web address]  Final Count Verified By: PATEL  Accurate Final Count?: Yes       Blood Loss  Mother: [facility] #[id number]     Start of Mother's Information      Delivery Blood Loss  23 0620 - 23 1849      None                 End of Mother's Information  Mother: [facility] #[id number]                Delivery Providers    Delivering clinician: Viet Baker DO     Provider Role    Lebron Chapa

## 2023-09-07 NOTE — ANESTHESIA POSTPROCEDURE EVALUATION
Department of Anesthesiology  Postprocedure Note    Patient: Mathew Aburto  MRN: [id number]  YOB: 1990  Date of evaluation: 9/7/2023      Procedure Summary     Date: 09/07/23 Room / Location:     Anesthesia Start: 5278 Anesthesia Stop: 6995    Procedure: Labor Analgesia Diagnosis:     Scheduled Providers:  Responsible Provider: Lorena Crystal MD    Anesthesia Type: epidural ASA Status: 2          Anesthesia Type: No value filed.     Masoud Phase I: Masoud Score: 10    Masoud Phase II:        Anesthesia Post Evaluation    Patient location during evaluation: floor  Patient participation: complete - patient participated  Level of consciousness: awake and alert  Pain score: 1  Airway patency: patent  Nausea & Vomiting: no vomiting and no nausea  Complications: no  Cardiovascular status: hemodynamically stable and blood pressure returned to baseline  Respiratory status: room air and acceptable  Hydration status: stable  Multimodal analgesia pain management approach  Pain management: adequate and satisfactory to patient

## 2023-09-07 NOTE — PROGRESS NOTES
Obstetric/Gynecology Resident Interval Note    Tracing reviewed. Two late decelerations noted at 1455 and 1504 with spontaneous return to baseline. Overall reassuring tracing at this time. Will continue to monitor closely.     Miguel Najera MD  OB/GYN Resident, PGY2  [employer], [place]  9/7/2023, 3:10 PM

## 2023-09-08 PROBLEM — N90.89 HEMATOMA OF LABIA MAJORA: Status: ACTIVE | Noted: 2023-09-08

## 2023-09-08 PROBLEM — Z86.718 HISTORY OF VENOUS THROMBOEMBOLISM: Status: ACTIVE | Noted: 2023-09-08

## 2023-09-08 LAB
HCT VFR BLD AUTO: 35.2 % (ref 36.3–47.1)
HGB BLD-MCNC: 11.7 G/DL (ref 11.9–15.1)

## 2023-09-08 PROCEDURE — 85014 HEMATOCRIT: CPT

## 2023-09-08 PROCEDURE — 1220000000 HC SEMI PRIVATE OB R&B

## 2023-09-08 PROCEDURE — 85018 HEMOGLOBIN: CPT

## 2023-09-08 PROCEDURE — 36415 COLL VENOUS BLD VENIPUNCTURE: CPT

## 2023-09-08 PROCEDURE — 6370000000 HC RX 637 (ALT 250 FOR IP): Performed by: OBSTETRICS & GYNECOLOGY

## 2023-09-08 PROCEDURE — 6370000000 HC RX 637 (ALT 250 FOR IP)

## 2023-09-08 PROCEDURE — 99024 POSTOP FOLLOW-UP VISIT: CPT | Performed by: OBSTETRICS & GYNECOLOGY

## 2023-09-08 RX ORDER — FLUCONAZOLE 150 MG/1
150 TABLET ORAL ONCE
Qty: 1 TABLET | Refills: 0 | Status: SHIPPED | OUTPATIENT
Start: 2023-09-08 | End: 2023-09-08

## 2023-09-08 RX ORDER — CEPHALEXIN 500 MG/1
500 CAPSULE ORAL EVERY 6 HOURS SCHEDULED
Status: DISCONTINUED | OUTPATIENT
Start: 2023-09-08 | End: 2023-09-09 | Stop reason: HOSPADM

## 2023-09-08 RX ADMIN — OXYCODONE HYDROCHLORIDE 10 MG: 5 TABLET ORAL at 08:53

## 2023-09-08 RX ADMIN — ACETAMINOPHEN 1000 MG: 500 TABLET ORAL at 03:36

## 2023-09-08 RX ADMIN — IBUPROFEN 600 MG: 600 TABLET, FILM COATED ORAL at 16:38

## 2023-09-08 RX ADMIN — CEPHALEXIN 500 MG: 500 CAPSULE ORAL at 18:08

## 2023-09-08 RX ADMIN — DOCUSATE SODIUM 100 MG: 100 CAPSULE, LIQUID FILLED ORAL at 08:53

## 2023-09-08 RX ADMIN — IBUPROFEN 600 MG: 600 TABLET, FILM COATED ORAL at 08:53

## 2023-09-08 RX ADMIN — BENZOCAINE AND LEVOMENTHOL: 200; 5 SPRAY TOPICAL at 08:53

## 2023-09-08 RX ADMIN — ACETAMINOPHEN 1000 MG: 500 TABLET ORAL at 19:26

## 2023-09-08 RX ADMIN — IBUPROFEN 600 MG: 600 TABLET, FILM COATED ORAL at 01:44

## 2023-09-08 RX ADMIN — OXYCODONE HYDROCHLORIDE 5 MG: 5 TABLET ORAL at 22:55

## 2023-09-08 RX ADMIN — DOCUSATE SODIUM 100 MG: 100 CAPSULE, LIQUID FILLED ORAL at 19:30

## 2023-09-08 RX ADMIN — IBUPROFEN 600 MG: 600 TABLET, FILM COATED ORAL at 22:54

## 2023-09-08 ASSESSMENT — PAIN DESCRIPTION - DESCRIPTORS
DESCRIPTORS: CRAMPING;THROBBING
DESCRIPTORS: ACHING
DESCRIPTORS: DISCOMFORT;PRESSURE;SORE
DESCRIPTORS: THROBBING;PRESSURE
DESCRIPTORS: DISCOMFORT;SORE;PRESSURE
DESCRIPTORS: PRESSURE

## 2023-09-08 ASSESSMENT — PAIN SCALES - GENERAL
PAINLEVEL_OUTOF10: 7
PAINLEVEL_OUTOF10: 7
PAINLEVEL_OUTOF10: 5
PAINLEVEL_OUTOF10: 6
PAINLEVEL_OUTOF10: 7
PAINLEVEL_OUTOF10: 7

## 2023-09-08 ASSESSMENT — PAIN - FUNCTIONAL ASSESSMENT
PAIN_FUNCTIONAL_ASSESSMENT: ACTIVITIES ARE NOT PREVENTED

## 2023-09-08 ASSESSMENT — PAIN DESCRIPTION - ORIENTATION
ORIENTATION: RIGHT
ORIENTATION: RIGHT

## 2023-09-08 ASSESSMENT — PAIN DESCRIPTION - LOCATION
LOCATION: VAGINA
LOCATION: ABDOMEN;VAGINA
LOCATION: VAGINA

## 2023-09-08 NOTE — LACTATION NOTE
INPATIENT CONSULT    Maternal /para status:     Maternal breastfeeding history:        Current pregnancy:    Gestational age: 39.6 weeks     C/section or vaginal delivery:       Birth weight: 3645  8#      SGA/LGA/IUGR/diabetes during pregnancy: na      Plan for feeding: breast      Breast pump at home: yes        Assessment of breastfeeding:  Mom reports baby feeding well at breast.  Baby to be circumcised this morning, discussed post circ behavior and benefits of STS following the procedure. Encouraged to call for latch check with next feed.            Reviewed:   - Breastfeeding packet  - Expectations for normal  feeding   - Hand expression  - Deep latch/milk transfer  - Cues for feeding (early/late)     Encouraged:   - Frequent skin to skin with mom or dad  - Frequent attempts to feed  - Calling for assistance as needed

## 2023-09-08 NOTE — PROGRESS NOTES
Obstetric/Gynecology Resident Interval Note    Routed to bedside secondary to patient feeling significant pain in the labial area. On arrival, the patient is in significant pain, in moderate distress because of the pain, nontoxic-appearing, hemodynamically stable, vital signs stable. Exam performed at bedside, and right labial hematoma is present at the lower two thirds of the right labia in its entirety, tender to palpation, no induration, no active external bleeding. Right Labial Hematoma    - Morphine 4mg PRN x2 doses    - Roxicodone PRN    - Motrin/tylenol   - Apply ice pack to area    - Monitor closely with repeat exams to monitor for expansion of hematoma    - Monitor for urinary retention 2/2 possible urethral occlusion, low threshold for gomes placement if needed      Senior Resident and Attending updated and in agreement with plan.      Zohreh Du MD  OB/GYN Resident, PGY2  [place], [place]  9/7/2023, 9:16 PM

## 2023-09-08 NOTE — FLOWSHEET NOTE
Pt assisted to wheelchair. Pt very uncomfortable. Medicated for nausea. Family supportive at bedside. Plan of care discussed.

## 2023-09-08 NOTE — CARE COORDINATION
Social Work     Sw reviewed medical record (current active problem list) and tox screens and found no current concerns. Sw spoke with mom and fob briefly to explain Sw role, inquire if any needs or concerns, and provide safe sleep education and discuss. Mom denied any needs or questions and informs baby has a safe sleep environment (elkin rice, pnp). Mom denied any current s/s of anxiety or depression and is aware to reach out to OB if any s/s occur after dc. Mom reports a really good support system (fob present, supportive, holding baby)and denied any current questions or needs. Mom reports this is her 1st child, dad also has an 6year old child who is excited for baby. Mom states ped will be [facility] in [place]. Sw encouraged family to reach out if any issues or concerns arise.

## 2023-09-08 NOTE — CARE COORDINATION
CASE MANAGEMENT POST-PARTUM TRANSITIONAL CARE PLAN    39 weeks gestation of pregnancy [Z3A.39]    OB Provider: Dr. Gregory Perez met w/ Percy Carrion and FOB/BF Patricia Suh at her bedside to discuss DCP. She is S/P  on 23 @ 200 of male infant    Writer verified address/phone number correct on facesheet. She states she lives with her BF/FOB Alexy Acosta and his 8yr old daughter (some of the time.) She verbalized no difficulties with transportation to and from doctors appointments or with paying for medications upon discharge home. [employer] and [facility] [place] Medicaid 2ndry insurance correct. Writer notified Percy Sheyla and Alexy Acosta they have 30 days from date of birth to add  to insurance policy. They verbalized understanding. Alexy Acosta confirmed a safe place for infant to sleep at home. Infant name on BC: undecided on 1st name at this time. [facility] [place]    DME: none  HOME CARE: none    [address] home of couplet in private vehicle in 1-2 days status post vaginal delivery.       Readmission Risk              Risk of Unplanned Readmission:  5

## 2023-09-09 VITALS
RESPIRATION RATE: 16 BRPM | OXYGEN SATURATION: 99 % | TEMPERATURE: 97.9 F | DIASTOLIC BLOOD PRESSURE: 67 MMHG | SYSTOLIC BLOOD PRESSURE: 97 MMHG | HEART RATE: 65 BPM

## 2023-09-09 LAB
HCT VFR BLD AUTO: 32.8 % (ref 36.3–47.1)
HGB BLD-MCNC: 10.9 G/DL (ref 11.9–15.1)

## 2023-09-09 PROCEDURE — 36415 COLL VENOUS BLD VENIPUNCTURE: CPT

## 2023-09-09 PROCEDURE — 6370000000 HC RX 637 (ALT 250 FOR IP)

## 2023-09-09 PROCEDURE — 99231 SBSQ HOSP IP/OBS SF/LOW 25: CPT | Performed by: OBSTETRICS & GYNECOLOGY

## 2023-09-09 PROCEDURE — 85014 HEMATOCRIT: CPT

## 2023-09-09 PROCEDURE — 85018 HEMOGLOBIN: CPT

## 2023-09-09 PROCEDURE — 6370000000 HC RX 637 (ALT 250 FOR IP): Performed by: OBSTETRICS & GYNECOLOGY

## 2023-09-09 RX ORDER — OXYCODONE HYDROCHLORIDE 5 MG/1
5 TABLET ORAL EVERY 6 HOURS PRN
Qty: 5 TABLET | Refills: 0 | Status: SHIPPED | OUTPATIENT
Start: 2023-09-09 | End: 2023-09-12

## 2023-09-09 RX ORDER — CEPHALEXIN 500 MG/1
500 CAPSULE ORAL 4 TIMES DAILY
Qty: 25 CAPSULE | Refills: 0 | Status: SHIPPED | OUTPATIENT
Start: 2023-09-09 | End: 2023-09-16

## 2023-09-09 RX ADMIN — IBUPROFEN 600 MG: 600 TABLET, FILM COATED ORAL at 05:44

## 2023-09-09 RX ADMIN — OXYCODONE HYDROCHLORIDE 5 MG: 5 TABLET ORAL at 14:21

## 2023-09-09 RX ADMIN — DOCUSATE SODIUM 100 MG: 100 CAPSULE, LIQUID FILLED ORAL at 08:47

## 2023-09-09 RX ADMIN — ACETAMINOPHEN 1000 MG: 500 TABLET ORAL at 08:47

## 2023-09-09 RX ADMIN — ACETAMINOPHEN 1000 MG: 500 TABLET ORAL at 14:41

## 2023-09-09 RX ADMIN — CEPHALEXIN 500 MG: 500 CAPSULE ORAL at 12:49

## 2023-09-09 RX ADMIN — IBUPROFEN 600 MG: 600 TABLET, FILM COATED ORAL at 12:49

## 2023-09-09 RX ADMIN — CEPHALEXIN 500 MG: 500 CAPSULE ORAL at 05:44

## 2023-09-09 RX ADMIN — CEPHALEXIN 500 MG: 500 CAPSULE ORAL at 00:44

## 2023-09-09 RX ADMIN — ACETAMINOPHEN 1000 MG: 500 TABLET ORAL at 02:00

## 2023-09-09 ASSESSMENT — PAIN DESCRIPTION - LOCATION
LOCATION: ABDOMEN
LOCATION: VAGINA
LOCATION: VAGINA
LOCATION: PERINEUM
LOCATION: PERINEUM
LOCATION: ABDOMEN
LOCATION: PERINEUM

## 2023-09-09 ASSESSMENT — PAIN SCALES - GENERAL
PAINLEVEL_OUTOF10: 4
PAINLEVEL_OUTOF10: 7
PAINLEVEL_OUTOF10: 7
PAINLEVEL_OUTOF10: 4
PAINLEVEL_OUTOF10: 6
PAINLEVEL_OUTOF10: 7
PAINLEVEL_OUTOF10: 8

## 2023-09-09 ASSESSMENT — PAIN DESCRIPTION - DESCRIPTORS
DESCRIPTORS: ACHING;CRAMPING;DISCOMFORT
DESCRIPTORS: DISCOMFORT;SORE;PRESSURE
DESCRIPTORS: SORE;DISCOMFORT;PRESSURE
DESCRIPTORS: ACHING;CRAMPING;DISCOMFORT
DESCRIPTORS: DISCOMFORT;PRESSURE;SORE
DESCRIPTORS: PRESSURE;ACHING
DESCRIPTORS: PRESSURE

## 2023-09-09 ASSESSMENT — PAIN - FUNCTIONAL ASSESSMENT
PAIN_FUNCTIONAL_ASSESSMENT: ACTIVITIES ARE NOT PREVENTED
PAIN_FUNCTIONAL_ASSESSMENT: ACTIVITIES ARE NOT PREVENTED

## 2023-09-09 NOTE — FLOWSHEET NOTE
PT discharged to private residence with personal belongings. Pt transported via wheelchair with  and spouse.

## 2023-09-09 NOTE — PROGRESS NOTES
your perineum. You will need to wear sanitary pads for about six weeks after delivery. If you had an episiotomy or vaginal tear, you will be sent home with a plastic squirt bottle. Fill it with warm water and squirt over the vaginal and anal area every time you urinate and defecate. Warm baths can be soothing to healing tissues. Apply warm or cold cloths to sore breasts. Apply ointment to cracked nipples. Use nursing pads for leaky breast.    Apply witch hazel pads to sore perineum (area between vagina and anus). Ask your doctor about when it is safe for you to shower or bathe. Sit in a sitz bath to soothe sore perineum and/or hemorrhoids. A sitz bath is soaking the hip and buttocks area in warm water. You can buy a plastic sitz bath at most [employer]. It will fit on your toilet. You can also use your bathtub. Diet    Eat a well balanced, healthy diet to help your recover from childbirth. If you are breastfeeding, you will need additional calories each day. You may also be advised to avoid certain foods. Some women experience constipation after childbirth. To avoid this problem:   Drink plenty of fluids. Eat foods high in fiber, such as:   Whole grain cereals and breads   Fruits and vegetables   Legumes (eg, beans, lentils)   Physical Activity    Labor and delivery is tiring. Rest when you can to regain your energy. Your doctor will encourage you to exercise by walking. Ask your doctor when you will be able to return to more strenuous exercise. Your doctor may suggest you do Kegel exercises to strengthen your pelvic muscles. To do these tighten your muscles as if you were stopping your urine flow. Hold for a few seconds and then relax. Do these throughout the day. Medications    Breastfeeding can cause your uterus to contract. If painful, your doctor may recommend a pain reliever. If you are breastfeeding, it's important to ask your doctor about taking medications.  You may receive from the hospital a list of medications to avoid. Once your doctor has approved your medications, it's important to: Take your medication as directed. Do not change the amount or the schedule. Do not stop taking them without talking to your doctor. Do not share them. Know what the results and side effects may be. Report bothersome side effects to your doctor. Some drugs can be dangerous when mixed. Talk to a doctor or pharmacist if you are taking more than one drug. This includes over-the-counter medication and herb or dietary supplements. Plan ahead for refills so you don't run out. Lifestyle Changes    Having a baby requires significant lifestyle changes. You and your doctor will plan lifestyle changes that will help you recover. Some things to keep in mind include: It is important to get enough rest so you can recover. Try sleeping when the baby sleeps. Ask your doctor when you can resume sexual relations. If you have not done so already, talk to your doctor about birth control options. If you are breastfeeding, consider a breast pump. Call your obstetrician and/or pediatrician for any questions that arise. Understand the changes your body is going through as it recovers from childbirth:   Hot and cold flashes as your body adjusts to new hormone and blood flow levels   Urinary or fecal incontinence due to stretched muscles   After pains from uterine contractions as the uterus shrinks   Vaginal bleeding (called lochia), which is heavier than your period (generally stops within two months)   Baby blues, feelings of irritability, sadness, crying, or anxiety. Postpartum depression is more severe, occurring in 10%-20% of new moms. If you experience such symptoms, contact your doctor. Consider joining a support group for new mothers. You can get encouragement and learn parenting strategies. Follow-up   Schedule a follow-up appointment as directed by your doctor.    Call Your Doctor If Any

## 2023-09-09 NOTE — DISCHARGE INSTRUCTIONS
infant. BLEEDING  Vaginal bleeding will decrease in amount over the next few weeks. You will notice that as your activity increases, your flow may increase. This is your body's way of telling you, you need take things easier and rest more often. Call your OB/ER if you are saturating one maxi pad in an hour & passing large clots. BREAST CARE  Take medications as recommended by your doctor or midwife for pain  If you develop a warm, red, tender area on your breast or develop a fever contact your lactation consultant. [phone]. For breastfeeding moms:  If you become engorged, feeding may be more difficult or painful for 1-2 days. You may find it helpful to hand express some milk so that the infant can latch on more easily. While breastfeeding, continue to take your prenatal vitamins as directed by your doctor or midwife. Refer to the breastfeeding booklet in the  folder/binder for more information. For any questions or concerns contact a Lactation Consultant. Leave a message and your call will be returned. JOYCE CARE  Shower daily, perineum with mild soap. Use the joyce-bottle until bleeding stops each time you use the restroom. Cleanse your perineum from front to back. If used, stitches will dissolve in 4-6 weeks. You may use a sitz bath or soak in a clean tub with drain open and water running for comfort. Kegel exercises will help restore bladder control. SWELLING  Try to keep your legs elevated when you are sitting. When lying down keep your legs elevated. CALL THE DOCTOR WITH THESE HEALTH CONCERNS OR PROBLEMS  If you have a temp of 100.4or more. If your bleeding has increased and you are saturating a pad in an hour. Your abdomen is tender to touch. You are passing blood clots bigger than the size of a lemon. If you are experiencing extreme weakness or dizziness. If you are having flu-like symptoms such as achy muscles or joints.   There is a foul smell or a green color to your vaginal bleeding. If you have pain that cannot be relieved. You have persistent burning with urination or frequency. Call if you have concerns about your well-being. You are unable to sleep, eat, or are having thoughts of harming yourself or your baby. You have a red, warm, tender area in you calf.

## 2023-09-09 NOTE — LACTATION NOTE
Mom reports her baby is nursing well and comfortably. Breastfeeding discharge reviewed discussing feeding frequency, how to know baby is getting enough, and comfort measures for engorgement. Encouraged to call for a lactation appointment as needed.

## 2023-09-09 NOTE — PROGRESS NOTES
CLINICAL PHARMACY NOTE: MEDS TO BEDS    Total # of Prescriptions Filled: 7   The following medications were delivered to the patient:  Acetaminophen 500mg  Ibuprofen 600mg  Senokot 8.6-50mg  Fluconazole 150mg  Enoxaparin 40mg/0.4ml  Cephalexin 500mg  Oxycodone 5mg (second delivery)    Additional Documentation: delivered to patient in room 747 9/9 at 10:16am, 11:34am. No co-pay.

## 2023-09-13 LAB — SURGICAL PATHOLOGY REPORT: NORMAL

## 2023-09-14 ENCOUNTER — HOSPITAL ENCOUNTER (OUTPATIENT)
Age: 33
Setting detail: SPECIMEN
Discharge: HOME | End: 2023-09-14

## 2023-09-14 ENCOUNTER — OFFICE VISIT (OUTPATIENT)
Dept: OBGYN CLINIC | Age: 33
End: 2023-09-14

## 2023-09-14 VITALS
WEIGHT: 157 LBS | HEIGHT: 66 IN | BODY MASS INDEX: 25.23 KG/M2 | SYSTOLIC BLOOD PRESSURE: 112 MMHG | DIASTOLIC BLOOD PRESSURE: 84 MMHG

## 2023-09-14 DIAGNOSIS — N94.89 LABIAL PAIN: ICD-10-CM

## 2023-09-14 DIAGNOSIS — N90.89 HEMATOMA OF LABIA MAJORA: Primary | ICD-10-CM

## 2023-09-14 DIAGNOSIS — N90.89 HEMATOMA OF LABIA MAJORA: ICD-10-CM

## 2023-09-14 PROBLEM — O41.00X0 OLIGOHYDRAMNIOS: Status: RESOLVED | Noted: 2023-09-05 | Resolved: 2023-09-14

## 2023-09-14 PROBLEM — O09.891 MATERNAL EXPOSURE TO ALCOHOL IN FIRST TRIMESTER: Status: RESOLVED | Noted: 2023-02-21 | Resolved: 2023-09-14

## 2023-09-14 RX ORDER — FLUCONAZOLE 150 MG/1
TABLET ORAL
COMMUNITY
Start: 2023-09-09

## 2023-09-14 NOTE — PROGRESS NOTES
Nuzhat Dailey  2023  12:41 PM        Nuzhat Dailey is a 26 y.o. female       The patient was seen. She has no chief complaints today. She delivered vaginally on 2023. She is  breast feeding and there is not any signs or symptoms of mastitis. The patient will complete the E.P.D.S. Evaluation form at follow up. She does not have any signs or symptoms of post partum depression. She denies any suicidal thoughts with a plan, intent to harm others, and delusional ideas. Today her lochia is light she denies any dizziness or shortness of breath. She is here regarding follow up on right labial hematoma. Pt states size is about the same. Pt states it is uncomfortable with sitting. Pt would like it drained if possible. Pt was counseled on risks/ benefits/ alternative options. Pt wishes to have it drained so she can sit and walk more comfortably. Procedure with risks/ complications d/w pt. Questions answered. Consent obtained . Her bowels are regular and she is voiding without difficulty. 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    Increased risk of Down Syndrome - the most common chromosomal birth defect (chromosomes - cells that carry genes and transmit heredity information)   Increased risk of miscarriage   20% increase at ages 28 to 44   35% increase at ages 42-43   Over 50% increase by age 39  Increased risk of  Section () for delivery method

## 2023-09-15 ENCOUNTER — OFFICE VISIT (OUTPATIENT)
Dept: OBGYN CLINIC | Age: 33
End: 2023-09-15

## 2023-09-15 VITALS
SYSTOLIC BLOOD PRESSURE: 112 MMHG | DIASTOLIC BLOOD PRESSURE: 64 MMHG | HEIGHT: 66 IN | BODY MASS INDEX: 25.07 KG/M2 | WEIGHT: 156 LBS

## 2023-09-15 DIAGNOSIS — N94.89 LABIAL PAIN: ICD-10-CM

## 2023-09-15 DIAGNOSIS — N90.89 HEMATOMA OF LABIA MAJORA: Primary | ICD-10-CM

## 2023-09-15 NOTE — PROGRESS NOTES
Juan Olvera  9/15/2023    YOB: 1990          The patient was seen today. She is here regarding follow up on labial hematoma. Pt states it has improved and appesrs softer. Pt denies any bleeding form site. Pt still taking antibiotic . Her bowels are regular and she is voiding without difficulty.      HPI:  Juan Olvera is a 26 y.o. female        OB History    Para Term  AB Living   2 1 1 0 1 1   SAB IAB Ectopic Molar Multiple Live Births   0 0 0 0 0 1      # Outcome Date GA Lbr Chucky/2nd Weight Sex Delivery Anes PTL Lv   2 Term 23 39w6d / 01:18 8 lb 0.6 oz (3.645 kg) M Vag-Spont EPI N FABI      Name: Yves Pacini: 8  Apgar5: 9   1 AB                Past Medical History:   Diagnosis Date    Asthma     History of abnormal cervical Pap smear     Hx of blood clots     rt shoulder       Past Surgical History:   Procedure Laterality Date    COLPOSCOPY      SHOULDER SURGERY Left 2020    SHOULDER LESION BIOPSY EXCISION performed by Iraida Aburto MD at [id number] [place] [address] History   Problem Relation Age of Onset    Thyroid Disease Paternal Grandmother     Stroke Maternal Grandmother     No Known Problems Father     No Known Problems Mother     Thyroid Disease Paternal Aunt     COPD Maternal Aunt        Social History     Socioeconomic History    Marital status: Single     Spouse name: Not on file    Number of children: Not on file    Years of education: Not on file    Highest education level: Not on file   Occupational History    Not on file   Tobacco Use    Smoking status: Never    Smokeless tobacco: Never   Vaping Use    Vaping Use: Never used   Substance and Sexual Activity    Alcohol use: Not Currently     Comment: 3x/week    Drug use: Never    Sexual activity: Yes   Other Topics Concern    Not on file   Social History Narrative    Not on file     Social Determinants of Health     Financial Resource Strain: Not on file   Food Insecurity: Not on file

## 2023-09-17 LAB
MICROORGANISM SPEC CULT: NORMAL
MICROORGANISM/AGENT SPEC: NORMAL
MICROORGANISM/AGENT SPEC: NORMAL
SPECIMEN DESCRIPTION: NORMAL

## 2023-09-19 ENCOUNTER — OFFICE VISIT (OUTPATIENT)
Dept: OBGYN CLINIC | Age: 33
End: 2023-09-19
Payer: COMMERCIAL

## 2023-09-19 VITALS
SYSTOLIC BLOOD PRESSURE: 112 MMHG | BODY MASS INDEX: 24.27 KG/M2 | DIASTOLIC BLOOD PRESSURE: 82 MMHG | HEIGHT: 66 IN | WEIGHT: 151 LBS

## 2023-09-19 DIAGNOSIS — N90.89 HEMATOMA OF LABIA MAJORA: ICD-10-CM

## 2023-09-19 DIAGNOSIS — N94.89 LABIAL PAIN: ICD-10-CM

## 2023-09-19 RX ORDER — OXYCODONE HYDROCHLORIDE 5 MG/1
5 TABLET ORAL EVERY 6 HOURS PRN
Qty: 12 TABLET | Refills: 0 | Status: SHIPPED | OUTPATIENT
Start: 2023-09-19 | End: 2023-09-22

## 2023-09-19 RX ORDER — CLINDAMYCIN HYDROCHLORIDE 300 MG/1
300 CAPSULE ORAL 3 TIMES DAILY
Qty: 30 CAPSULE | Refills: 0 | Status: SHIPPED | OUTPATIENT
Start: 2023-09-19 | End: 2023-09-29

## 2023-09-19 NOTE — PROGRESS NOTES
Joao Beard  2023    YOB: 1990          The patient was seen today. She is here regarding follow up on right labial hematoma. Pt states she is moving easier and sitting better. Edema has decreased. Pt urinating and normal BMs . Her bowels are regular and she is voiding without difficulty.      HPI:  Joao Beard is a 26 y.o. female        OB History    Para Term  AB Living   2 1 1 0 1 1   SAB IAB Ectopic Molar Multiple Live Births   0 0 0 0 0 1      # Outcome Date GA Lbr Chucky/2nd Weight Sex Delivery Anes PTL Lv   2 Term 23 39w6d / 01:18 8 lb 0.6 oz (3.645 kg) M Vag-Spont EPI N FABI      Name: Alexi Currie: 8  Apgar5: 9   1 AB                Past Medical History:   Diagnosis Date    Asthma     History of abnormal cervical Pap smear     Hx of blood clots     rt shoulder       Past Surgical History:   Procedure Laterality Date    COLPOSCOPY      SHOULDER SURGERY Left 2020    SHOULDER LESION BIOPSY EXCISION performed by Alisha Myers MD at [address] [place] [address] History   Problem Relation Age of Onset    Thyroid Disease Paternal Grandmother     Stroke Maternal Grandmother     No Known Problems Father     No Known Problems Mother     Thyroid Disease Paternal Aunt     COPD Maternal Aunt        Social History     Socioeconomic History    Marital status: Single     Spouse name: Not on file    Number of children: Not on file    Years of education: Not on file    Highest education level: Not on file   Occupational History    Not on file   Tobacco Use    Smoking status: Never    Smokeless tobacco: Never   Vaping Use    Vaping Use: Never used   Substance and Sexual Activity    Alcohol use: Not Currently     Comment: 3x/week    Drug use: Never    Sexual activity: Yes   Other Topics Concern    Not on file   Social History Narrative    Not on file     Social Determinants of Health     Financial Resource Strain: Not on file   Food Insecurity: Not on file

## 2023-10-06 ENCOUNTER — CLINICAL DOCUMENTATION (OUTPATIENT)
Dept: PHYSICAL THERAPY | Facility: CLINIC | Age: 33
End: 2023-10-06

## 2023-10-06 NOTE — DISCHARGE SUMMARY
[] [address]  [address] [facility] [place] [facility].  [phone]  F: [phone] [] [address] [facility]  [address]   [address]  P: [phone]  F: [phone] [x] [address]  [address] [facility]  P: [phone]  F: [phone] [] [facility] [phone]  F: [phone] [] [address] [facility]  [address] [facility]   [address]   P: [phone]  F: [phone]  [] [address] [facility].   P: [phone]  F: [phone] [] [address] [facility]  [address]  P: [phone]  F: [phone] [] [address] [facility]  [address] [facility]  [place]: [phone]  F: [phone] [] [address] [facility] [address]  [place]: [phone]  F: [phone]      Physical Therapy Discharge Note    Date: 10/6/2023      Patient: Mathew Aburto  : 1990  MRN: [id number]    Physician: Emilie Kinney DO                              Insurance: 1. [employer], auth after eval, ded 3000/met, oop max 6000/4232.94 rem, $60 copay  2. [place] visits  auth after , no pt liab     Approved 12 visits plus eval 8.15.23-9..23 auth # [id number]     Medical Diagnosis:   N94.9 (ICD-10-CM) - Round ligament pain   M62.89 (ICD-10-CM) - Pelvic floor dysfunction in female                             Rehab Codes: M25.551, N81.84, R27.8, R29.3, M62.81  Onset Date: 2023                                  Next 's appt: 23        Visit# / total visits: 3/12;                      Cancels/No Shows:

## 2023-10-19 ENCOUNTER — OFFICE VISIT (OUTPATIENT)
Dept: OBGYN CLINIC | Age: 33
End: 2023-10-19

## 2023-10-19 VITALS
BODY MASS INDEX: 23.78 KG/M2 | SYSTOLIC BLOOD PRESSURE: 126 MMHG | HEIGHT: 66 IN | DIASTOLIC BLOOD PRESSURE: 84 MMHG | WEIGHT: 148 LBS

## 2023-10-19 PROBLEM — O09.529 AMA (ADVANCED MATERNAL AGE) MULTIGRAVIDA 35+: Status: RESOLVED | Noted: 2023-02-09 | Resolved: 2023-10-19

## 2023-10-19 PROBLEM — O43.109 ABNORMAL PLACENTA: Status: RESOLVED | Noted: 2023-04-18 | Resolved: 2023-10-19

## 2023-10-19 PROBLEM — O41.8X90 SEPARATION OF CHORION AND AMNION MEMBRANES, ANTEPARTUM: Status: RESOLVED | Noted: 2023-09-05 | Resolved: 2023-10-19

## 2023-10-19 PROBLEM — N90.89 HEMATOMA OF LABIA MAJORA: Status: RESOLVED | Noted: 2023-09-08 | Resolved: 2023-10-19

## 2023-10-19 RX ORDER — METRONIDAZOLE 500 MG/1
500 TABLET ORAL 2 TIMES DAILY
Qty: 14 TABLET | Refills: 0 | Status: SHIPPED | OUTPATIENT
Start: 2023-10-19 | End: 2023-10-26

## 2023-10-19 NOTE — PROGRESS NOTES
Physical Exam:  Chaperone for Intimate Exam  Chaperone was offered and accepted as part of the rooming process. Chaperone: NA       General Appearance: This  is a well Developed, well Nourished, well groomed female. Her BMI was reviewed. Nutritional decision making was discussed. Skin:  There was a Normal Inspection of the skin without rashes or lesions. There were no rashes. (Papular, Maculopapular, Hives, Pustular, Macular)     There were no lesions (Ulcers, Erythema, Abn. Appearing Nevi)            Lymphatic:  No Lymph Nodes were Palpable in the neck , axilla or groin.  0 # Of Lymph Nodes; Location ; Character [Normal]  [Shotty] [Tender] [Enlarged]     Neck and EENT:  The neck was supple. There were no masses   The thyroid was not enlarged and had no masses. Perrla, EOMI B/L, TMI B/L No Abnormalities. Throat inspected-No exudates or Masses, Nares Patent No Masses        Respiratory: The lungs were auscultated and found to be clear. There were no rales, rhonchi or wheezes. There was a good respiratory effort. Cardiovascular: The heart was in a regular rate and rhythm. . No S3 or S4. There was no murmur appreciated. Location, grade, and radiation are not applicable. Extremities: The patients extremities were without calf tenderness, edema, or varicosities. There was full range of motion in all four extremities. Pulses in all four extremities were appreciated and are 2/4. Abdomen: The abdomen was soft and non-tender. There were good bowel sounds in all quadrants and there was no guarding, rebound or rigidity. On evaluation there was no evidence of hepatosplenomegaly and there was no costal vertebral mat tenderness bilaterally. No hernias were appreciated. Abdominal Scars: none    Psych: The patient had a normal Orientation to: Time, Place, Person, and Situation  There is no Mood / Affect changes    Breast:  (Chest)  deferred  Self breast exams were reviewed in detail.

## 2024-01-12 NOTE — FLOWSHEET NOTE
+ RSV , MD notified  
benefit from skilled physical therapy services in order to: improve pain tolerance, lumbar AROM, pelvic alignment and (B) LE strength. STG: (to be met in 5 treatments)  Patient will report pain as 2/10  Patient will demonstrate lumbar AROM to 100% within all planes in order to increase ease of performing ADLs  Purchase maternity belt if needed  Patient will self report good understanding of pushing mechanics in order to increase ease of delivery    Independent with Home Exercise Programs  LTG: (to be met in 10 treatments)  Patient will report LEFS as <50% functionally improved in order to indicate improved overall quality of life  Patient will improve (B) LE strength to 5/5 in order to improve stability to pelvis   Patient will demonstrate equal pelvic alignment   Patient will report pain as 0/10       Pt. Education:  [x] Yes  [] No  [x] Reviewed Prior HEP/Ed  Method of Education: [x] Verbal  [x] Demo  [] Written  Comprehension of Education:  [x] Verbalizes understanding. [x] Demonstrates understanding. [x] Needs review. [] Demonstrates/verbalizes HEP/Ed previously given. Plan: [x] Continue current frequency toward long and short term goals.           Time In:8:15 am            Time Out: 9:02 am    Electronically signed by:  Leticia Myers, PT

## 2024-02-21 ENCOUNTER — HOSPITAL ENCOUNTER (OUTPATIENT)
Age: 34
Setting detail: SPECIMEN
Discharge: HOME | End: 2024-02-21

## 2024-02-21 ENCOUNTER — OFFICE VISIT (OUTPATIENT)
Dept: OBGYN CLINIC | Age: 34
End: 2024-02-21
Payer: COMMERCIAL

## 2024-02-21 VITALS
WEIGHT: 142 LBS | BODY MASS INDEX: 22.82 KG/M2 | SYSTOLIC BLOOD PRESSURE: 114 MMHG | HEIGHT: 66 IN | DIASTOLIC BLOOD PRESSURE: 70 MMHG

## 2024-02-21 DIAGNOSIS — Z11.51 SPECIAL SCREENING EXAMINATION FOR HUMAN PAPILLOMAVIRUS (HPV): ICD-10-CM

## 2024-02-21 DIAGNOSIS — Z01.419 WELL FEMALE EXAM WITH ROUTINE GYNECOLOGICAL EXAM: Primary | ICD-10-CM

## 2024-02-21 PROCEDURE — G8484 FLU IMMUNIZE NO ADMIN: HCPCS | Performed by: NURSE PRACTITIONER

## 2024-02-21 PROCEDURE — 99395 PREV VISIT EST AGE 18-39: CPT | Performed by: NURSE PRACTITIONER

## 2024-02-21 NOTE — PROGRESS NOTES
History and Physical  [facility]  [facility] [address]  [place], [place]  [address] [phone]   Fax [phone]  Leigh Gao  [birth year]24              33 y.o.  Chief Complaint   Patient presents with    Annual Exam       No LMP recorded. (Menstrual status: Breastfeeding).             Primary Care Physician: Sylvain Brown Jr., MD    The patient was seen and examined. She has no chief complaint today and is here for her annual exam.  Her bowels are regular. There are no voiding complaints. She denies any bloating.  She denies vaginal discharge and was counseled on STD's and the need for barrier contraception.     HPI : Leigh Gao is a 33 y.o. female     Annual exam  Considering IUD  Instructed to follow up with hematology for DVT/PE for clearance  Hx leep     no Bloating  no Early Satiety  no Unexplained weight change of more than 15 lbs  no  PMB  no  PCB  ________________________________________________________________________  OB History    Para Term  AB Living   2 1 1 0 1 1   SAB IAB Ectopic Molar Multiple Live Births   0 0 0 0 0 1      # Outcome Date GA Lbr Chucky/2nd Weight Sex Delivery Anes PTL Lv   2 Term 23 39w6d / 01:18 3.645 kg (8 lb 0.6 oz) M Vag-Spont EPI N FABI      Name: DANDY GAO      Apgar1: 8  Apgar5: 9   1 AB              Past Medical History:   Diagnosis Date    Asthma     History of abnormal cervical Pap smear     Hx of blood clots     rt shoulder                                                                   Past Surgical History:   Procedure Laterality Date    COLPOSCOPY      LEEP      SHOULDER SURGERY Left 2020    SHOULDER LESION BIOPSY EXCISION performed by Christian Taylor MD at [facility] OR     Family History   Problem Relation Age of Onset    Thyroid Disease Paternal Grandmother     Stroke Maternal Grandmother     No Known Problems Father     No Known Problems Mother     Thyroid Disease Paternal Aunt     COPD

## 2024-02-23 LAB
HPV I/H RISK 4 DNA CVX QL NAA+PROBE: NOT DETECTED
HPV SAMPLE: NORMAL
HPV, INTERPRETATION: NORMAL
HPV16 DNA CVX QL NAA+PROBE: NOT DETECTED
HPV18 DNA CVX QL NAA+PROBE: NOT DETECTED
SPECIMEN DESCRIPTION: NORMAL

## 2024-03-01 LAB — CYTOLOGY REPORT: NORMAL

## (undated) DEVICE — ST CHARLES MINOR ABDOMINAL PK: Brand: MEDLINE INDUSTRIES, INC.

## (undated) DEVICE — GOWN,AURORA,NONREINFORCED,LARGE: Brand: MEDLINE

## (undated) DEVICE — 3M™ STERI-STRIP™ COMPOUND BENZOIN TINCTURE 40 BAGS/CARTON 4 CARTONS/CASE C1544: Brand: 3M™ STERI-STRIP™

## (undated) DEVICE — SYRINGE 20ML LL S/C 50

## (undated) DEVICE — SHEET, T, LAPAROTOMY, STERILE: Brand: MEDLINE

## (undated) DEVICE — SUTURE VCRL + SZ 3-0 L27IN ABSRB UD L26MM SH 1/2 CIR VCP416H

## (undated) DEVICE — SINGLE PORT MANIFOLD: Brand: NEPTUNE 2

## (undated) DEVICE — PAD,NON-ADHERENT,3X8,STERILE,LF,1/PK: Brand: MEDLINE

## (undated) DEVICE — MERCY HEALTH ST CHARLES: Brand: MEDLINE INDUSTRIES, INC.

## (undated) DEVICE — STRIP,CLOSURE,WOUND,MEDI-STRIP,1/2X4: Brand: MEDLINE

## (undated) DEVICE — HYPODERMIC SAFETY NEEDLE: Brand: MAGELLAN

## (undated) DEVICE — COVER,MAYO STAND,STERILE: Brand: MEDLINE

## (undated) DEVICE — GLOVE ORTHO 7 1/2   MSG9475

## (undated) DEVICE — DRESSING TRNSPAR W5XL4.5IN FLM SHT SEMIPERMEABLE WIND